# Patient Record
Sex: FEMALE | Race: OTHER | HISPANIC OR LATINO | URBAN - METROPOLITAN AREA
[De-identification: names, ages, dates, MRNs, and addresses within clinical notes are randomized per-mention and may not be internally consistent; named-entity substitution may affect disease eponyms.]

---

## 2017-12-15 ENCOUNTER — OUTPATIENT (OUTPATIENT)
Dept: OUTPATIENT SERVICES | Facility: HOSPITAL | Age: 62
LOS: 1 days | Discharge: HOME | End: 2017-12-15

## 2017-12-15 DIAGNOSIS — R19.03 RIGHT LOWER QUADRANT ABDOMINAL SWELLING, MASS AND LUMP: ICD-10-CM

## 2017-12-29 ENCOUNTER — OUTPATIENT (OUTPATIENT)
Dept: OUTPATIENT SERVICES | Facility: HOSPITAL | Age: 62
LOS: 1 days | Discharge: HOME | End: 2017-12-29

## 2017-12-29 DIAGNOSIS — R19.03 RIGHT LOWER QUADRANT ABDOMINAL SWELLING, MASS AND LUMP: ICD-10-CM

## 2018-01-19 ENCOUNTER — OUTPATIENT (OUTPATIENT)
Dept: OUTPATIENT SERVICES | Facility: HOSPITAL | Age: 63
LOS: 1 days | Discharge: HOME | End: 2018-01-19

## 2018-01-19 DIAGNOSIS — Z12.31 ENCOUNTER FOR SCREENING MAMMOGRAM FOR MALIGNANT NEOPLASM OF BREAST: ICD-10-CM

## 2019-03-11 PROBLEM — Z00.00 ENCOUNTER FOR PREVENTIVE HEALTH EXAMINATION: Status: ACTIVE | Noted: 2019-03-11

## 2019-03-13 ENCOUNTER — INPATIENT (INPATIENT)
Facility: HOSPITAL | Age: 64
LOS: 3 days | End: 2019-03-17
Attending: INTERNAL MEDICINE | Admitting: INTERNAL MEDICINE
Payer: MEDICAID

## 2019-03-13 VITALS
TEMPERATURE: 97 F | DIASTOLIC BLOOD PRESSURE: 48 MMHG | HEART RATE: 68 BPM | OXYGEN SATURATION: 100 % | RESPIRATION RATE: 18 BRPM | SYSTOLIC BLOOD PRESSURE: 83 MMHG

## 2019-03-13 DIAGNOSIS — Z98.891 HISTORY OF UTERINE SCAR FROM PREVIOUS SURGERY: Chronic | ICD-10-CM

## 2019-03-13 LAB
ALBUMIN SERPL ELPH-MCNC: 2.7 G/DL — LOW (ref 3.5–5.2)
ALBUMIN SERPL ELPH-MCNC: 3.4 G/DL — LOW (ref 3.5–5.2)
ALP SERPL-CCNC: 627 U/L — HIGH (ref 30–115)
ALP SERPL-CCNC: 846 U/L — HIGH (ref 30–115)
ALT FLD-CCNC: 114 U/L — HIGH (ref 0–41)
ALT FLD-CCNC: 149 U/L — HIGH (ref 0–41)
ANION GAP SERPL CALC-SCNC: 27 MMOL/L — HIGH (ref 7–14)
ANISOCYTOSIS BLD QL: SLIGHT — SIGNIFICANT CHANGE UP
APPEARANCE UR: ABNORMAL
APTT BLD: 43.3 SEC — HIGH (ref 27–39.2)
AST SERPL-CCNC: 637 U/L — HIGH (ref 0–41)
AST SERPL-CCNC: 809 U/L — HIGH (ref 0–41)
BACTERIA # UR AUTO: ABNORMAL /HPF
BASE EXCESS BLDV CALC-SCNC: -13.8 MMOL/L — LOW (ref -2–2)
BASE EXCESS BLDV CALC-SCNC: -9.4 MMOL/L — LOW (ref -2–2)
BASOPHILS # BLD AUTO: 0 K/UL — SIGNIFICANT CHANGE UP (ref 0–0.2)
BASOPHILS # BLD AUTO: 0.01 K/UL — SIGNIFICANT CHANGE UP (ref 0–0.2)
BASOPHILS NFR BLD AUTO: 0 % — SIGNIFICANT CHANGE UP (ref 0–1)
BASOPHILS NFR BLD AUTO: 0.3 % — SIGNIFICANT CHANGE UP (ref 0–1)
BILIRUB DIRECT SERPL-MCNC: 1.9 MG/DL — HIGH (ref 0–0.2)
BILIRUB INDIRECT FLD-MCNC: 0.1 MG/DL — LOW (ref 0.2–1.2)
BILIRUB SERPL-MCNC: 2 MG/DL — HIGH (ref 0.2–1.2)
BILIRUB SERPL-MCNC: 2.4 MG/DL — HIGH (ref 0.2–1.2)
BILIRUB UR-MCNC: NEGATIVE — SIGNIFICANT CHANGE UP
BUN SERPL-MCNC: 82 MG/DL — CRITICAL HIGH (ref 10–20)
CA-I SERPL-SCNC: 1.06 MMOL/L — LOW (ref 1.12–1.3)
CA-I SERPL-SCNC: 1.09 MMOL/L — LOW (ref 1.12–1.3)
CALCIUM SERPL-MCNC: 9.3 MG/DL — SIGNIFICANT CHANGE UP (ref 8.5–10.1)
CHLORIDE SERPL-SCNC: 98 MMOL/L — SIGNIFICANT CHANGE UP (ref 98–110)
CK SERPL-CCNC: 53 U/L — SIGNIFICANT CHANGE UP (ref 0–225)
CO2 SERPL-SCNC: 13 MMOL/L — LOW (ref 17–32)
COLOR SPEC: YELLOW — SIGNIFICANT CHANGE UP
CREAT SERPL-MCNC: 3.1 MG/DL — HIGH (ref 0.7–1.5)
D DIMER BLD IA.RAPID-MCNC: 688 NG/ML DDU — HIGH (ref 0–230)
DIFF PNL FLD: ABNORMAL
ELLIPTOCYTES BLD QL SMEAR: SLIGHT — SIGNIFICANT CHANGE UP
EOSINOPHIL # BLD AUTO: 0 K/UL — SIGNIFICANT CHANGE UP (ref 0–0.7)
EOSINOPHIL # BLD AUTO: 0 K/UL — SIGNIFICANT CHANGE UP (ref 0–0.7)
EOSINOPHIL NFR BLD AUTO: 0 % — SIGNIFICANT CHANGE UP (ref 0–8)
EOSINOPHIL NFR BLD AUTO: 0 % — SIGNIFICANT CHANGE UP (ref 0–8)
EPI CELLS # UR: ABNORMAL /HPF
FIBRINOGEN PPP-MCNC: 127 MG/DL — LOW (ref 204.4–570.6)
FLU A RESULT: NEGATIVE — SIGNIFICANT CHANGE UP
FLU A RESULT: NEGATIVE — SIGNIFICANT CHANGE UP
FLUAV AG NPH QL: NEGATIVE — SIGNIFICANT CHANGE UP
FLUBV AG NPH QL: NEGATIVE — SIGNIFICANT CHANGE UP
GAS PNL BLDV: 135 MMOL/L — LOW (ref 136–145)
GAS PNL BLDV: 141 MMOL/L — SIGNIFICANT CHANGE UP (ref 136–145)
GAS PNL BLDV: SIGNIFICANT CHANGE UP
GAS PNL BLDV: SIGNIFICANT CHANGE UP
GLUCOSE SERPL-MCNC: 35 MG/DL — CRITICAL LOW (ref 70–99)
GLUCOSE UR QL: 100 MG/DL
HCO3 BLDV-SCNC: 14 MMOL/L — LOW (ref 22–29)
HCO3 BLDV-SCNC: 17 MMOL/L — LOW (ref 22–29)
HCT VFR BLD CALC: 20.3 % — LOW (ref 37–47)
HCT VFR BLD CALC: 28.2 % — LOW (ref 37–47)
HCT VFR BLDA CALC: 20.4 % — LOW (ref 34–44)
HCT VFR BLDA CALC: 29.8 % — LOW (ref 34–44)
HGB BLD CALC-MCNC: 6.6 G/DL — LOW (ref 14–18)
HGB BLD CALC-MCNC: 9.7 G/DL — LOW (ref 14–18)
HGB BLD-MCNC: 7 G/DL — CRITICAL LOW (ref 12–16)
HGB BLD-MCNC: 9 G/DL — LOW (ref 12–16)
IMM GRANULOCYTES NFR BLD AUTO: 9.9 % — HIGH (ref 0.1–0.3)
INR BLD: 1.31 RATIO — HIGH (ref 0.65–1.3)
KETONES UR-MCNC: NEGATIVE — SIGNIFICANT CHANGE UP
LACTATE BLDV-MCNC: 9.4 MMOL/L — HIGH (ref 0.5–1.6)
LACTATE BLDV-MCNC: 9.5 MMOL/L — HIGH (ref 0.5–1.6)
LACTATE SERPL-SCNC: 11.7 MMOL/L — CRITICAL HIGH (ref 0.5–2.2)
LEUKOCYTE ESTERASE UR-ACNC: NEGATIVE — SIGNIFICANT CHANGE UP
LIDOCAIN IGE QN: 37 U/L — SIGNIFICANT CHANGE UP (ref 7–60)
LYMPHOCYTES # BLD AUTO: 0.46 K/UL — LOW (ref 1.2–3.4)
LYMPHOCYTES # BLD AUTO: 0.56 K/UL — LOW (ref 1.2–3.4)
LYMPHOCYTES # BLD AUTO: 12 % — LOW (ref 20.5–51.1)
LYMPHOCYTES # BLD AUTO: 14.7 % — LOW (ref 20.5–51.1)
MAGNESIUM SERPL-MCNC: 2.6 MG/DL — HIGH (ref 1.8–2.4)
MANUAL SMEAR VERIFICATION: SIGNIFICANT CHANGE UP
MCHC RBC-ENTMCNC: 27.7 PG — SIGNIFICANT CHANGE UP (ref 27–31)
MCHC RBC-ENTMCNC: 28.2 PG — SIGNIFICANT CHANGE UP (ref 27–31)
MCHC RBC-ENTMCNC: 31.9 G/DL — LOW (ref 32–37)
MCHC RBC-ENTMCNC: 34.5 G/DL — SIGNIFICANT CHANGE UP (ref 32–37)
MCV RBC AUTO: 81.9 FL — SIGNIFICANT CHANGE UP (ref 81–99)
MCV RBC AUTO: 86.8 FL — SIGNIFICANT CHANGE UP (ref 81–99)
METAMYELOCYTES # FLD: 1 % — HIGH (ref 0–0)
MONOCYTES # BLD AUTO: 0.65 K/UL — HIGH (ref 0.1–0.6)
MONOCYTES # BLD AUTO: 0.73 K/UL — HIGH (ref 0.1–0.6)
MONOCYTES NFR BLD AUTO: 17 % — HIGH (ref 1.7–9.3)
MONOCYTES NFR BLD AUTO: 19 % — HIGH (ref 1.7–9.3)
MYELOCYTES NFR BLD: 3 % — HIGH (ref 0–0)
NEUTROPHILS # BLD AUTO: 2.22 K/UL — SIGNIFICANT CHANGE UP (ref 1.4–6.5)
NEUTROPHILS # BLD AUTO: 2.23 K/UL — SIGNIFICANT CHANGE UP (ref 1.4–6.5)
NEUTROPHILS NFR BLD AUTO: 57 % — SIGNIFICANT CHANGE UP (ref 42.2–75.2)
NEUTROPHILS NFR BLD AUTO: 58.1 % — SIGNIFICANT CHANGE UP (ref 42.2–75.2)
NEUTS BAND # BLD: 1 % — SIGNIFICANT CHANGE UP (ref 0–6)
NITRITE UR-MCNC: NEGATIVE — SIGNIFICANT CHANGE UP
NRBC # BLD: 0 /100 WBCS — SIGNIFICANT CHANGE UP (ref 0–0)
NRBC # BLD: 0 /100 — SIGNIFICANT CHANGE UP (ref 0–0)
NRBC # BLD: SIGNIFICANT CHANGE UP /100 WBCS (ref 0–0)
PCO2 BLDV: 41 MMHG — SIGNIFICANT CHANGE UP (ref 41–51)
PCO2 BLDV: 43 MMHG — SIGNIFICANT CHANGE UP (ref 41–51)
PH BLDV: 7.14 — LOW (ref 7.26–7.43)
PH BLDV: 7.24 — LOW (ref 7.26–7.43)
PH UR: 6 — SIGNIFICANT CHANGE UP (ref 5–8)
PLAT MORPH BLD: SIGNIFICANT CHANGE UP
PLATELET # BLD AUTO: 20 K/UL — LOW (ref 130–400)
PLATELET # BLD AUTO: 30 K/UL — LOW (ref 130–400)
PO2 BLDV: 22 MMHG — SIGNIFICANT CHANGE UP (ref 20–40)
PO2 BLDV: 23 MMHG — SIGNIFICANT CHANGE UP (ref 20–40)
POTASSIUM BLDV-SCNC: 4.8 MMOL/L — SIGNIFICANT CHANGE UP (ref 3.3–5.6)
POTASSIUM BLDV-SCNC: 5.7 MMOL/L — HIGH (ref 3.3–5.6)
POTASSIUM SERPL-MCNC: 6 MMOL/L — CRITICAL HIGH (ref 3.5–5)
POTASSIUM SERPL-SCNC: 6 MMOL/L — CRITICAL HIGH (ref 3.5–5)
PROMYELOCYTES # FLD: 0 % — SIGNIFICANT CHANGE UP (ref 0–0)
PROT SERPL-MCNC: 3.9 G/DL — LOW (ref 6–8)
PROT SERPL-MCNC: 5.4 G/DL — LOW (ref 6–8)
PROT UR-MCNC: 30 MG/DL
PROTHROM AB SERPL-ACNC: 15 SEC — HIGH (ref 9.95–12.87)
RBC # BLD: 2.48 M/UL — LOW (ref 4.2–5.4)
RBC # BLD: 3.25 M/UL — LOW (ref 4.2–5.4)
RBC # FLD: 17.8 % — HIGH (ref 11.5–14.5)
RBC # FLD: 18.2 % — HIGH (ref 11.5–14.5)
RBC BLD AUTO: NORMAL — SIGNIFICANT CHANGE UP
RBC CASTS # UR COMP ASSIST: ABNORMAL /HPF
RSV RESULT: NEGATIVE — SIGNIFICANT CHANGE UP
RSV RNA RESP QL NAA+PROBE: NEGATIVE — SIGNIFICANT CHANGE UP
SAO2 % BLDV: 24 % — SIGNIFICANT CHANGE UP
SAO2 % BLDV: 30 % — SIGNIFICANT CHANGE UP
SODIUM SERPL-SCNC: 138 MMOL/L — SIGNIFICANT CHANGE UP (ref 135–146)
SP GR SPEC: 1.01 — SIGNIFICANT CHANGE UP (ref 1.01–1.03)
TROPONIN T SERPL-MCNC: <0.01 NG/ML — SIGNIFICANT CHANGE UP
UROBILINOGEN FLD QL: 1 MG/DL (ref 0.2–0.2)
VARIANT LYMPHS # BLD: 7 % — HIGH (ref 0–5)
WBC # BLD: 3.82 K/UL — LOW (ref 4.8–10.8)
WBC # BLD: 3.84 K/UL — LOW (ref 4.8–10.8)
WBC # FLD AUTO: 3.82 K/UL — LOW (ref 4.8–10.8)
WBC # FLD AUTO: 3.84 K/UL — LOW (ref 4.8–10.8)

## 2019-03-13 RX ORDER — VANCOMYCIN HCL 1 G
750 VIAL (EA) INTRAVENOUS ONCE
Qty: 0 | Refills: 0 | Status: COMPLETED | OUTPATIENT
Start: 2019-03-13 | End: 2019-03-13

## 2019-03-13 RX ORDER — RAMIPRIL 5 MG
1 CAPSULE ORAL
Qty: 0 | Refills: 0 | COMMUNITY

## 2019-03-13 RX ORDER — SODIUM CHLORIDE 9 MG/ML
1214 INJECTION, SOLUTION INTRAVENOUS ONCE
Qty: 0 | Refills: 0 | Status: COMPLETED | OUTPATIENT
Start: 2019-03-13 | End: 2019-03-13

## 2019-03-13 RX ORDER — VANCOMYCIN HCL 1 G
750 VIAL (EA) INTRAVENOUS EVERY 12 HOURS
Qty: 0 | Refills: 0 | Status: DISCONTINUED | OUTPATIENT
Start: 2019-03-13 | End: 2019-03-14

## 2019-03-13 RX ORDER — NOREPINEPHRINE BITARTRATE/D5W 8 MG/250ML
0.05 PLASTIC BAG, INJECTION (ML) INTRAVENOUS
Qty: 8 | Refills: 0 | Status: DISCONTINUED | OUTPATIENT
Start: 2019-03-13 | End: 2019-03-15

## 2019-03-13 RX ORDER — CALCIUM GLUCONATE 100 MG/ML
2 VIAL (ML) INTRAVENOUS ONCE
Qty: 0 | Refills: 0 | Status: COMPLETED | OUTPATIENT
Start: 2019-03-13 | End: 2019-03-13

## 2019-03-13 RX ORDER — VASOPRESSIN 20 [USP'U]/ML
0.04 INJECTION INTRAVENOUS
Qty: 100 | Refills: 0 | Status: DISCONTINUED | OUTPATIENT
Start: 2019-03-13 | End: 2019-03-14

## 2019-03-13 RX ORDER — SODIUM CHLORIDE 9 MG/ML
1000 INJECTION, SOLUTION INTRAVENOUS ONCE
Qty: 0 | Refills: 0 | Status: COMPLETED | OUTPATIENT
Start: 2019-03-13 | End: 2019-03-13

## 2019-03-13 RX ORDER — ACETAMINOPHEN 500 MG
650 TABLET ORAL ONCE
Qty: 0 | Refills: 0 | Status: COMPLETED | OUTPATIENT
Start: 2019-03-13 | End: 2019-03-13

## 2019-03-13 RX ORDER — SODIUM CHLORIDE 9 MG/ML
1000 INJECTION, SOLUTION INTRAVENOUS
Qty: 0 | Refills: 0 | Status: DISCONTINUED | OUTPATIENT
Start: 2019-03-13 | End: 2019-03-14

## 2019-03-13 RX ORDER — SODIUM CHLORIDE 9 MG/ML
1000 INJECTION, SOLUTION INTRAVENOUS
Qty: 0 | Refills: 0 | Status: DISCONTINUED | OUTPATIENT
Start: 2019-03-13 | End: 2019-03-13

## 2019-03-13 RX ORDER — RAMIPRIL 5 MG
0 CAPSULE ORAL
Qty: 0 | Refills: 0 | COMMUNITY

## 2019-03-13 RX ORDER — DEXTROSE 50 % IN WATER 50 %
50 SYRINGE (ML) INTRAVENOUS ONCE
Qty: 0 | Refills: 0 | Status: COMPLETED | OUTPATIENT
Start: 2019-03-13 | End: 2019-03-13

## 2019-03-13 RX ORDER — FENTANYL CITRATE 50 UG/ML
25 INJECTION INTRAVENOUS ONCE
Qty: 0 | Refills: 0 | Status: DISCONTINUED | OUTPATIENT
Start: 2019-03-13 | End: 2019-03-13

## 2019-03-13 RX ORDER — ASPIRIN/CALCIUM CARB/MAGNESIUM 324 MG
1 TABLET ORAL
Qty: 0 | Refills: 0 | COMMUNITY

## 2019-03-13 RX ORDER — PIPERACILLIN AND TAZOBACTAM 4; .5 G/20ML; G/20ML
3.38 INJECTION, POWDER, LYOPHILIZED, FOR SOLUTION INTRAVENOUS EVERY 12 HOURS
Qty: 0 | Refills: 0 | Status: DISCONTINUED | OUTPATIENT
Start: 2019-03-13 | End: 2019-03-14

## 2019-03-13 RX ORDER — PIPERACILLIN AND TAZOBACTAM 4; .5 G/20ML; G/20ML
3.38 INJECTION, POWDER, LYOPHILIZED, FOR SOLUTION INTRAVENOUS ONCE
Qty: 0 | Refills: 0 | Status: COMPLETED | OUTPATIENT
Start: 2019-03-13 | End: 2019-03-13

## 2019-03-13 RX ORDER — FUROSEMIDE 40 MG
1 TABLET ORAL
Qty: 0 | Refills: 0 | COMMUNITY

## 2019-03-13 RX ORDER — SODIUM BICARBONATE 1 MEQ/ML
50 SYRINGE (ML) INTRAVENOUS ONCE
Qty: 0 | Refills: 0 | Status: COMPLETED | OUTPATIENT
Start: 2019-03-13 | End: 2019-03-13

## 2019-03-13 RX ORDER — SODIUM CHLORIDE 9 MG/ML
1000 INJECTION, SOLUTION INTRAVENOUS ONCE
Qty: 0 | Refills: 0 | Status: DISCONTINUED | OUTPATIENT
Start: 2019-03-13 | End: 2019-03-13

## 2019-03-13 RX ORDER — HYDROCORTISONE 20 MG
100 TABLET ORAL ONCE
Qty: 0 | Refills: 0 | Status: COMPLETED | OUTPATIENT
Start: 2019-03-13 | End: 2019-03-13

## 2019-03-13 RX ORDER — ALBUTEROL 90 UG/1
2.5 AEROSOL, METERED ORAL ONCE
Qty: 0 | Refills: 0 | Status: COMPLETED | OUTPATIENT
Start: 2019-03-13 | End: 2019-03-13

## 2019-03-13 RX ADMIN — SODIUM CHLORIDE 2000 MILLILITER(S): 9 INJECTION, SOLUTION INTRAVENOUS at 20:00

## 2019-03-13 RX ADMIN — Medication 200 GRAM(S): at 17:00

## 2019-03-13 RX ADMIN — SODIUM CHLORIDE 100 MILLILITER(S): 9 INJECTION, SOLUTION INTRAVENOUS at 17:57

## 2019-03-13 RX ADMIN — SODIUM CHLORIDE 1214 MILLILITER(S): 9 INJECTION, SOLUTION INTRAVENOUS at 15:45

## 2019-03-13 RX ADMIN — Medication 75 MILLIGRAM(S): at 18:50

## 2019-03-13 RX ADMIN — Medication 250 MILLIGRAM(S): at 22:00

## 2019-03-13 RX ADMIN — Medication 50 MILLILITER(S): at 15:55

## 2019-03-13 RX ADMIN — FENTANYL CITRATE 25 MICROGRAM(S): 50 INJECTION INTRAVENOUS at 19:05

## 2019-03-13 RX ADMIN — Medication 50 MILLIEQUIVALENT(S): at 17:33

## 2019-03-13 RX ADMIN — PIPERACILLIN AND TAZOBACTAM 200 GRAM(S): 4; .5 INJECTION, POWDER, LYOPHILIZED, FOR SOLUTION INTRAVENOUS at 21:00

## 2019-03-13 RX ADMIN — VASOPRESSIN 2.4 UNIT(S)/MIN: 20 INJECTION INTRAVENOUS at 21:01

## 2019-03-13 RX ADMIN — SODIUM CHLORIDE 1000 MILLILITER(S): 9 INJECTION, SOLUTION INTRAVENOUS at 16:08

## 2019-03-13 RX ADMIN — SODIUM CHLORIDE 100 MILLILITER(S): 9 INJECTION, SOLUTION INTRAVENOUS at 20:46

## 2019-03-13 RX ADMIN — Medication 3.8 MICROGRAM(S)/KG/MIN: at 20:01

## 2019-03-13 RX ADMIN — SODIUM CHLORIDE 50 MILLILITER(S): 9 INJECTION, SOLUTION INTRAVENOUS at 16:35

## 2019-03-13 RX ADMIN — ALBUTEROL 2.5 MILLIGRAM(S): 90 AEROSOL, METERED ORAL at 17:52

## 2019-03-13 RX ADMIN — Medication 100 MILLIGRAM(S): at 18:34

## 2019-03-13 RX ADMIN — Medication 650 MILLIGRAM(S): at 15:55

## 2019-03-13 NOTE — ED PROVIDER NOTE - SECONDARY DIAGNOSIS.
ADONIS (acute kidney injury) Uremia Hyperkalemia Lymphoma Hypoglycemia Anemia Lactic acidosis Severe protein-calorie malnutrition Severe dehydration Cholecystitis

## 2019-03-13 NOTE — ED PROVIDER NOTE - PROGRESS NOTE DETAILS
I reassessed the patient, reviewed vital signs. heart RRR, 2+ radial pulse, skin warm and dry. I reassessed the patient, reviewed vital signs. heart RRR, 2+ radial pulse, skin warm and dry. Character of sepsis of low suspicion for acute bacterial cause, higher suspicion for viral URI/flu, as such not given abx initially. Signed off care to Dr. MADELINE Weber who will f/u nephro and ICU consults that have been placed, non-con CT's, and reassess pt. Patient accepted to MICU by Dr. Adams. Bedside ultrasound significant for likely cholecystitis, STAT surgery consult called and will come evaluate patient. Spoke w/ nephrology who will also come evaluate patient. Patient signed out to MICU resident who will follow up recommendations from consultants and results of CT chest/abdomen. spoke with Dr. Gonzalez (nephrologist) aware of pt and consult. agrees with plan for IV Abx and ICU admission. recommends D5 with 3 amps of bicarb. discussed plan with pts daughter. aware of pts diagnosis. agrees with plan

## 2019-03-13 NOTE — ED PROVIDER NOTE - CARE PLAN
Principal Discharge DX:	Severe sepsis  Secondary Diagnosis:	ADONIS (acute kidney injury)  Secondary Diagnosis:	Uremia  Secondary Diagnosis:	Hypoglycemia  Secondary Diagnosis:	Severe dehydration  Secondary Diagnosis:	Anemia  Secondary Diagnosis:	Lactic acidosis

## 2019-03-13 NOTE — H&P ADULT - HISTORY OF PRESENT ILLNESS
64 y/o F with PMHx lymphoma (diagnosed 1 week ago via neck mass, not on chemo), HTN, "heart failure" that resulted in fluid overload, presenting for nausea and vomiting. After finding out about her diagnosis, she has not been eating a drinking and eating for the past 1 wk, but has been taking lasix 40mg daily. She woke up this morning feeling, nauseous, weak, and this then resulted in the pt going to . There she was hypotensive and sent to the ER. She was hypotensive, febrile in the ED. She received 5L bolus, was started on levo and vasopressin, a R IJ central line and A line was placed bc her bp on the cuff was labile. She had CT abd and RUQ sono which showed concern for cholecystitis. Surgery evaluated pt and was not impressed with clinical picture. GI evaluated pt at bedside and stated she is low risk for chloledochelithiasis and recommended percutaneous lisbeth if the labwork worsens. Pt has no hx of gallbladder dx,  no abd sx besides .

## 2019-03-13 NOTE — ED ADULT NURSE NOTE - PMH
Hypertension, unspecified type    Lymphoma, unspecified body region, unspecified lymphoma type    Weakness

## 2019-03-13 NOTE — H&P ADULT - NSHPPHYSICALEXAM_GEN_ALL_CORE
General: cachectic ill appearing woman, Czech speaking  Cardiac: RRR, S1S2  Lungs: CTAB  Abd: no specific area of pain in the abd, the pain moves around, though she was Lenz pos with RUQ sono  LE: no swelling  Neuro: AAOx3, nonfocal

## 2019-03-13 NOTE — ED PROCEDURE NOTE - CPROC ED INFUS LINE DETAIL1
The catheter was placed using sterile technique./All lumen(s) aspirated and flushed without difficulty./The guidewire was recovered./The location was identified, and the area was draped and prepped./Ultrasound guidance was used during placement.

## 2019-03-13 NOTE — CONSULT NOTE ADULT - SUBJECTIVE AND OBJECTIVE BOX
General Surgery Consultation Note    64 y/o F with PMHx lymphoma (diagnosed 1 week ago via neck mass, not on chemo), HTN, "heart failure" that resulted in fluid overload, presenting for nausea and vomiting. After finding out about her diagnosis, she has not been eating a drinking and eating for the past 1 wk, but has been taking lasix 40mg daily. She woke up this morning feeling, nauseous, weak, and this then resulted in the pt going to . There she was hypotensive and sent to the ER. She was hypotensive, febrile in the ED. She received 5L bolus, was started on levo and vasopressin, a R IJ central line and A line was placed bc her bp on the cuff was labile. She had CT abd and RUQ sono which showed concern for cholecystitis. Surgery evaluated pt and was not impressed with clinical picture. GI evaluated pt at bedside and stated she is low risk for chloledochelithiasis and recommended percutaneous lisbeth if the labwork worsens. Pt has no hx of gallbladder dx,  no abd sx besides . (13 Mar 2019 20:45)    PAST MEDICAL & SURGICAL HISTORY:  Hypertension, unspecified type  Lymphoma, unspecified body region, unspecified lymphoma type  Weakness  S/P     Home Meds: Home Medications:  aspirin 81 mg oral tablet, chewable: 1 tab(s) orally once a day (13 Mar 2019 21:04)  furosemide 40 mg oral tablet: 1 tab(s) orally once a day (13 Mar 2019 21:04)  ramipril 10 mg oral capsule: 1 cap(s) orally once a day (13 Mar 2019 21:04)    Allergies:   No Known Allergies    Intolerances  None reported    Soc:   Advanced Directives: Presumed Full Code     ROS:    REVIEW OF SYSTEMS    [x] A ten-point review of systems was otherwise negative except as noted.  [ ] Due to altered mental status/intubation, subjective information were not able to be obtained from the patient. History was obtained, to the extent possible, from review of the chart and collateral sources of information.      CURRENT MEDICATIONS:   --------------------------------------------------------------------------------------  Cardiovascular Medications  norepinephrine Infusion 0.05 MICROgram(s)/kG/Min IV Continuous <Continuous>    Gastrointestinal Medications  dextrose 5% 1000 milliLiter(s) IV Continuous <Continuous>    Antimicrobial/Immunologic Medications  piperacillin/tazobactam IVPB. 3.375 Gram(s) IV Intermittent once  piperacillin/tazobactam IVPB. 3.375 Gram(s) IV Intermittent every 12 hours  vancomycin  IVPB 750 milliGRAM(s) IV Intermittent once  vancomycin  IVPB 750 milliGRAM(s) IV Intermittent every 12 hours    Endocrine/Metabolic Medications  vasopressin Infusion 0.04 Unit(s)/Min IV Continuous <Continuous>    --------------------------------------------------------------------------------------  VITAL SIGNS, INS/OUTS (last 24 hours):  --------------------------------------------------------------------------------------  ICU Vital Signs Last 24 Hrs  T(C): 37.7 (13 Mar 2019 16:20), Max: 38.2 (13 Mar 2019 15:19)  T(F): 99.8 (13 Mar 2019 16:20), Max: 100.8 (13 Mar 2019 15:19)  HR: 69 (13 Mar 2019 16:20) (68 - 69)  BP: 102/49 (13 Mar 2019 16:20) (83/48 - 102/49)  RR: 17 (13 Mar 2019 16:20) (17 - 18)  SpO2: 100% (13 Mar 2019 16:20) (100% - 100%)    PHYSICAL EXAM    General: NAD, cachectic ill appearing  HEENT: NCAT  Cardiac: RRR S1, S2  Respiratory: CTAB, normal respiratory effort  Abdomen: Soft, non-distended, non-tender  Neuro: Grossly intact  Vascular: Pulses 2+ throughout, extremities well perfused  Skin: Warm/dry, normal color, no jaundice    LABS  --------------------------------------------------------------------------------------  Labs:  CAPILLARY BLOOD GLUCOSE      POCT Blood Glucose.: 209 mg/dL (13 Mar 2019 17:21)  POCT Blood Glucose.: 27 mg/dL (13 Mar 2019 16:25)                          9.0    3.84  )-----------( 30       ( 13 Mar 2019 15:05 )             28.2       Auto Neutrophil %: 57.0 % (19 @ 15:05)  Band Neutrophils %: 1.0 % (19 @ 15:05)        138  |  98  |  82<HH>  ----------------------------<  35<LL>  6.0<HH>   |  13<L>  |  3.1<H>      eGFR if Non African American: 15 mL/min/1.73M2 (19 @ 15:05)      LFTs:             x    | x    | x        ------------------[x       ( 13 Mar 2019 15:10 )  x    | x    | x           Lipase:37     Amylase:x         Blood Gas Venous - Lactate: 9.4 mmoL/L (19 @ 17:15)  Blood Gas Venous - Lactate: 9.5 mmoL/L (19 @ 16:40)      Coags:     15.00  ----< 1.31    ( 13 Mar 2019 15:05 )     43.3        CARDIAC MARKERS ( 13 Mar 2019 15:05 )  x     / <0.01 ng/mL / x     / x     / x              Urinalysis Basic - ( 13 Mar 2019 19:44 )    Color: Yellow / Appearance: Cloudy / S.015 / pH: x  Gluc: x / Ketone: Negative  / Bili: Negative / Urobili: 1.0 mg/dL   Blood: x / Protein: 30 mg/dL / Nitrite: Negative   Leuk Esterase: Negative / RBC: 3-5 /HPF / WBC x   Sq Epi: x / Non Sq Epi: Few /HPF / Bacteria: Few /HPF      IMAGING RESULTS  < from: US Abdomen Limited (19 @ 20:01) >  IMPRESSION:  Cholelithiasis with pericholecystic fluid and positive Lenz's sign. Findings compatible with acute cholecystitis. Upper abdominal ascites.    < from: CT Abdomen and Pelvis No Cont (19 @ 18:22) >  IMPRESSION:   1. Aneurysmal dilation of the abdominal aorta up to 4.3 cm with a focal area of crescentic wall density suggestive of an intramural hematoma.  2. Gallbladder wall edema and perihepatic fluid. Findings suggestive of underlying cholecystitis.  3. Extensive retroperitoneal and pelvic lymphadenopathy with associated splenomegaly. Findings compatible with underlying lymphoma.  4. Air and fluid collection in the anterior thigh measuring up to 5.2 cm.    < from: Xray Chest 1 View-PORTABLE IMMEDIATE (19 @ 16:23) >  Impression:    No radiographic evidence of acute cardiopulmonary disease.

## 2019-03-13 NOTE — ED PROVIDER NOTE - CLINICAL SUMMARY MEDICAL DECISION MAKING FREE TEXT BOX
63yoF with h/o HTN, recently diagnosed lymphoma, on ASA/ramipril/Lasix, presents with generalized weakness, significantly decreased PO, and rhinorrhea/cough x3 days. Temp to 100.0F last night per daughter, who translates, and pt states only that her entire body aches her, no one place more than another, and no other acute complaints. On exam, febrile, hypotensive, saturating well on RA, NAD, chronic ill/cachectic appearance, non toxic appearing, head NCAT, EOMI grossly, anicteric, MM dry, no JVD, RRR, nml S1/S2, no m/r/g, lungs CTAB, no w/r/r, abd soft, NT, ND, nml BS, no rebound or guarding, AAO, CN's 3-12 grossly intact, VANCE spontaneously, no leg cyanosis or edema, skin warm, dry, no rashes or hives. No meningeal signs, cellulitis, e/o PNA on exam. UA pending. Patient with ADONIS with uremia, hyperkalemia with mildly peaked T waves, hypoglycemia. Given abx for concern for sepsis at this time, D50/calcium gluc/albuterol for hyperK. Also given hydrocortisone for concern for adrenal insufficiency. Pt is now with 2L NS and 1L going in of D5, with SBP >100 currently, urine production on Urrutia insertion. Nephrology consult pending, unsuccessful at reaching them to this point, official consult placed. Patient accepted to ICU, they will also f/u the non-con CT's placed. 63yoF with h/o HTN, recently diagnosed lymphoma, on ASA/ramipril/Lasix, presents with generalized weakness, significantly decreased PO, and rhinorrhea/cough x3 days. Temp to 100.0F last night per daughter, who translates, and pt states only that her entire body aches her, no one place more than another, and no other acute complaints. On exam, febrile, hypotensive, saturating well on RA, NAD, chronic ill/cachectic appearance, non toxic appearing, head NCAT, EOMI grossly, anicteric, MM dry, no JVD, RRR, nml S1/S2, no m/r/g, lungs CTAB, no w/r/r, abd soft, NT, ND, nml BS, no rebound or guarding, AAO, CN's 3-12 grossly intact, VANCE spontaneously, no leg cyanosis or edema, skin warm, dry, no rashes or hives. No meningeal signs, cellulitis, e/o PNA on exam. UA pending. Patient with ADONIS with uremia, hyperkalemia with mildly peaked T waves, hypoglycemia. Given abx for concern for sepsis at this time, D50/calcium gluc/albuterol for hyperK. Also given hydrocortisone for concern for adrenal insufficiency. Pt is now with 2L NS and 1L going in of D5, with SBP >100 currently, urine production on Urrutia insertion. Furthermore, pt is non-toxic appearing and mentating well throughout. Nephrology consult pending, unsuccessful at reaching them to this point, official consult placed. Patient accepted to ICU, they will also f/u the non-con CT's placed. 63yoF with h/o HTN, recently diagnosed lymphoma, on ASA/ramipril/Lasix, presents with generalized weakness, significantly decreased PO, and rhinorrhea/cough x3 days. Temp to 100.0F last night per daughter, who translates, and pt states only that her entire body aches her, no one place more than another, and no other acute complaints. On exam, febrile, hypotensive, saturating well on RA, NAD, chronic ill/cachectic appearance, non toxic appearing, head NCAT, EOMI grossly, anicteric, MM dry, no JVD, RRR, nml S1/S2, no m/r/g, lungs CTAB, no w/r/r, abd soft, NT, ND, nml BS, no rebound or guarding, AAO, CN's 3-12 grossly intact, VANCE spontaneously, no leg cyanosis or edema, skin warm, dry, no rashes or hives. No meningeal signs, cellulitis, e/o PNA on exam. UA pending. Patient with ADONIS with uremia, hyperkalemia with mildly peaked T waves, hypoglycemia. Given abx for concern for sepsis at this time, D50/calcium gluc/albuterol for hyperK. Also given hydrocortisone for concern for adrenal insufficiency. Pt is now with 2L NS and 1L going in of D5, with SBP >100 currently, urine production on Urrutia insertion. Furthermore, pt is non-toxic appearing and mentating well throughout. Nephrology consult pending, unsuccessful at reaching them to this point, official consult placed. Patient accepted to ICU, they will also f/u the non-con CT's placed. Also of note, after admission, bedside U/S shows pericholecystic fluid and GB wall edema with gallstones, concerning for cholecystitis with cholelithiasis. Surgery consulted and will evaluate pt.

## 2019-03-13 NOTE — H&P ADULT - ASSESSMENT
1. Septic Shock 2/2 Cholecystitis in setting of Newly Diagnosed Lymphoma, not on chemo  Concern for Liver Failure Due to Transaminitis and Elevated LA not responsive to fluid resuscitation, will r/o DIC  any alternative source of infection aside from cholecystitis missed due to lack of contrast?  -start on zosyn and vanc, check for c diff due to recent hospitalization, pan cx  -c/w levo and vasopressin, goal sys bp 110-140  -RUQ, CT chest and abd appreciated  -GI rec: low chance of choledocholithiasis, if pt and labwork worsens then consider IR c/s for perc lisbeth  -Gen Sx rec: not impressed that this sepsis is from cholecystitis, will follow up pending repeat bloodwork to see if lefts are trending up or down  -plt are not clumped, no schistocytes  -npo for now in case of intervention    2. Lymphoma: will call [facility] in the am and obtain path report    3. Aneurysmal dilation of the abdominal aorta up to 4.3 cm with a focal area of crescentic wall density suggestive of an intramural hematoma  -case discussed with Vascular, nothing to do now, they will do need to do a formal cta once pt is no longer septic  -for now keep bp 110-140 systolic, NOT higher    4. ADONIS likely prerenal due to dec po intake and continued ace and lasix  -hold all diuretics, stop all antihypertensives  -in light of acidosis, will c/w D5+bicarb drip  -if pt becomes hyperkalemic again, will tx and consider dialysis     5. Normocytic Anemia, Thrombocytopenia  -will check fecal occult blood, no prior hgb for comparison  -transfuse if hgb<7  -plt are not clumped, no schistocytes  -will send iron studies  -could be 2/2 malignancy    6. DVT PPx: hold off due to thrombocytopenia 1. Septic Shock 2/2 Cholecystitis in setting of Newly Diagnosed Lymphoma, not on chemo  Concern for Liver Failure Due to Transaminitis and Elevated LA not responsive to fluid resuscitation, will r/o DIC  any alternative source of infection aside from cholecystitis missed due to lack of contrast?  -start on zosyn and vanc, check for c diff due to recent hospitalization, pan cx  -c/w levo and vasopressin, goal sys bp 110-140  -RUQ, CT chest and abd appreciated  -GI rec: low chance of choledocholithiasis, if pt and labwork worsens then consider IR c/s for perc lisbeth  -Gen Sx rec: not impressed that this sepsis is from cholecystitis, will follow up pending repeat bloodwork to see if lefts are trending up or down  -plt are not clumped, no schistocytes  -npo for now in case of intervention    2. Lymphoma: will call [facility] in the am and obtain path report    3. Aneurysmal dilation of the abdominal aorta up to 4.3 cm with a focal area of crescentic wall density suggestive of an intramural hematoma  -case discussed with Vascular, nothing to do now, they will do need to do a formal cta once pt is no longer septic  -for now keep bp 110-140 systolic, NOT higher    4. ADONIS likely prerenal due to dec po intake and continued ace and lasix  -hold all diuretics, stop all antihypertensives  -in light of acidosis, will c/w D5+bicarb drip  -if pt becomes hyperkalemic again, will tx and consider dialysis     5. Normocytic Anemia, Thrombocytopenia  -will check fecal occult blood, no prior hgb for comparison  -transfuse if hgb<7  -plt are not clumped, no schistocytes  -will send iron studies  -could be 2/2 malignancy    6. DVT PPx: hold off due to thrombocytopenia      Interval History: Bloodwork indicates pt is in DIC, high D dimer, low fibrinogen, elevated coags, thrombocytopenia. Likely 2/2 sepsis from cholecystitis and malignancy. Will recontact Sx and possibly IR for perc cholecystostomy. 1. Septic Shock 2/2 Cholecystitis in setting of Newly Diagnosed Lymphoma, not on chemo  Concern for Liver Failure Due to Transaminitis and Elevated LA not responsive to fluid resuscitation, will r/o DIC  any alternative source of infection aside from cholecystitis missed due to lack of contrast?  -start on zosyn and vanc, check for c diff due to recent hospitalization, pan cx  -c/w levo and vasopressin, goal sys bp 110-140  -RUQ, CT chest and abd appreciated  -GI rec: low chance of choledocholithiasis, if pt and labwork worsens then consider IR c/s for perc lisbeth  -Gen Sx rec: not impressed that this sepsis is from cholecystitis, will follow up pending repeat bloodwork to see if lefts are trending up or down  -plt are not clumped, no schistocytes  -npo for now in case of intervention    2. Lymphoma: will call [facility] in the am and obtain path report    3. Aneurysmal dilation of the abdominal aorta up to 4.3 cm with a focal area of crescentic wall density suggestive of an intramural hematoma  -case discussed with Vascular, nothing to do now, they will do need to do a formal cta once pt is no longer septic  -for now keep bp 110-140 systolic, NOT higher    4. ADONIS likely prerenal due to dec po intake and continued ace and lasix  -hold all diuretics, stop all antihypertensives  -in light of acidosis, will c/w D5+bicarb drip  -if pt becomes hyperkalemic again, will tx and consider dialysis     5. Normocytic Anemia, Thrombocytopenia  -will check fecal occult blood, no prior hgb for comparison  -transfuse if hgb<7  -plt are not clumped, no schistocytes  -will send iron studies  -could be 2/2 malignancy    6. DVT PPx: hold off due to thrombocytopenia      Interval History: Bloodwork indicates pt is in DIC, high D dimer, low fibrinogen, elevated coags, thrombocytopenia. Likely 2/2 sepsis from cholecystitis and malignancy. Will recontact Sx and possibly IR for perc cholecystostomy.  Case discussed with Dr Rock from Sx-pt is not a surgical candidate. 1. Septic Shock 2/2 Cholecystitis in setting of Newly Diagnosed Lymphoma, not on chemo  Concern for Liver Failure Due to Transaminitis and Elevated LA not responsive to fluid resuscitation, will r/o DIC  any alternative source of infection aside from cholecystitis missed due to lack of contrast?  -start on erin and vanc, check for c diff due to recent hospitalization, pan cx  -c/w levo and vasopressin, goal sys bp 110-140  -RUQ, CT chest and abd appreciated  -GI rec: low chance of choledocholithiasis, if pt and labwork worsens then consider IR c/s for perc lisbeth  -Gen Sx rec: not impressed that this sepsis is from cholecystitis, will follow up pending repeat bloodwork to see if lefts are trending up or down  -plt are not clumped, no schistocytes  -npo for now in case of intervention    2. Lymphoma: will call [facility] in the am and obtain path report    3. Aneurysmal dilation of the abdominal aorta up to 4.3 cm with a focal area of crescentic wall density suggestive of an intramural hematoma  -case discussed with Vascular, nothing to do now, they will do need to do a formal cta once pt is no longer septic  -for now keep bp 110-140 systolic, NOT higher    4. ADONIS likely prerenal due to dec po intake and continued ace and lasix  -hold all diuretics, stop all antihypertensives  -in light of acidosis, will c/w D5+bicarb drip  -if pt becomes hyperkalemic again, will tx and consider dialysis     5. Normocytic Anemia, Thrombocytopenia  -will check fecal occult blood, no prior hgb for comparison  -transfuse if hgb<7  -plt are not clumped, no schistocytes  -will send iron studies  -could be 2/2 malignancy    6. DVT PPx: hold off due to thrombocytopenia      Interval History: Bloodwork indicates pt is in DIC, high D dimer, low fibrinogen, elevated coags, thrombocytopenia. Likely 2/2 sepsis from cholecystitis and malignancy. Will recontact Sx and possibly IR for perc cholecystostomy.  Case discussed with Dr Rock from Sx-pt is not a surgical candidate.   Case discussed with Dr Landau from IR-they said to treat her DIC right now with plt, cryo and whatever else is needed to correct her coagulopathy bc she has such a high risk of bleeding on the table. Start transfusing around 2am for possible lap lisbeth in the am. IR will follow.

## 2019-03-13 NOTE — ED ADULT NURSE NOTE - NSIMPLEMENTINTERV_GEN_ALL_ED
Implemented All Fall with Harm Risk Interventions:  [place] to call system. Call bell, personal items and telephone within reach. Instruct patient to call for assistance. Room bathroom lighting operational. Non-slip footwear when patient is off stretcher. Physically safe environment: no spills, clutter or unnecessary equipment. Stretcher in lowest position, wheels locked, appropriate side rails in place. Provide visual cue, wrist band, yellow gown, etc. Monitor gait and stability. Monitor for mental status changes and reorient to person, place, and time. Review medications for side effects contributing to fall risk. Reinforce activity limits and safety measures with patient and family. Provide visual clues: red socks.

## 2019-03-13 NOTE — ED ADULT TRIAGE NOTE - CHIEF COMPLAINT QUOTE
pt sent in from [facility] for LOW b/p and vomiting and dizziness. diagnosed with lymphatic CA 1 week ago

## 2019-03-13 NOTE — CONSULT NOTE ADULT - ASSESSMENT
64 y/o female w/ pmhx of recently diagnosed lymphoma ( not on ttt), HTN,? CHF was sent in from an urgent care for further evaluation for hypotension. patient was recently diagnosed with lymphoma and was recently hospitalized in another facility with CHF exacerbation ( as per her daughter). Since Monday daughter noticed decrease PO intake then today she started to have non bloody vomitus.. Patient is a poor historian , I spoke with her using the  phone # [phone], she reported having diffuse body aches and diffuse abdominal discomfort.    - Septic Shock , unclear etiology could be due to cholecystitis  - Elevated LFTs ( mainly cholestatic pattern) could be due to cholecystitis vs infiltrative disease secondary to lymphoma  Plan:  ICU monitoring  IV broad spectrum Abx  monitor lFTs  surgery evaluation  may consider IR evaluation for percutaneous cholecystostomy  check CK total, indirect sameer  low probability for choledocholithiasis, given low sameer and no biliary dilation   will follow up

## 2019-03-13 NOTE — ED PROCEDURE NOTE - NS ED ATTENDING STATEMENT MOD
Attending Only
I have personally performed a face to face diagnostic evaluation on this patient. I have reviewed the ACP note and agree with the history, exam and plan of care, except as noted.

## 2019-03-13 NOTE — H&P ADULT - NSHPLABSRESULTS_GEN_ALL_CORE
CARDIAC MARKERS ( 13 Mar 2019 15:05 )  x     / <0.01 ng/mL / x     / x     / x                            9.0    3.84  )-----------( 30       ( 13 Mar 2019 15:05 )             28.2       138  |  98  |  82<HH>  ----------------------------<  35<LL>  6.0<HH>   |  13<L>  |  3.1<H>  Ca    9.3      13 Mar 2019 15:05  Mg     2.6       TPro  5.4<L>  /  Alb  3.4<L>  /  TBili  2.4<H>  /  DBili  x   /  AST  809<H>  /  ALT  149<H>  /  AlkPhos  846<H>      POCT Blood Glucose.: 209 mg/dL (13 Mar 2019 17:21)  POCT Blood Glucose.: 27 mg/dL (13 Mar 2019 16:25)    LIVER FUNCTIONS - ( 13 Mar 2019 15:05 )  Alb: 3.4 g/dL / Pro: 5.4 g/dL / ALK PHOS: 846 U/L / ALT: 149 U/L / AST: 809 U/L / GGT: x           PT/INR - ( 13 Mar 2019 15:05 )   PT: 15.00 sec;   INR: 1.31 ratio    PTT - ( 13 Mar 2019 15:05 )  PTT:43.3 sec    Urinalysis Basic - ( 13 Mar 2019 19:44 )  Color: Yellow / Appearance: Cloudy / S.015 / pH: x  Gluc: x / Ketone: Negative  / Bili: Negative / Urobili: 1.0 mg/dL   Blood: x / Protein: 30 mg/dL / Nitrite: Negative   Leuk Esterase: Negative / RBC: 3-5 /HPF / WBC x   Sq Epi: x / Non Sq Epi: Few /HPF / Bacteria: Few /HPF

## 2019-03-13 NOTE — ED PROVIDER NOTE - ATTENDING CONTRIBUTION TO CARE
63yoF with h/o HTN, recently diagnosed lymphoma, on ASA/ramipril/Lasix, presents with generalized weakness, significantly decreased PO, and rhinorrhea/cough x3 days. Temp to 100.0F last night per daughter, who translates, and pt states only that her entire body aches her, no one place more than another, and no other acute complaints. On exam, febrile, hypotensive, saturating well on RA, NAD, chronic ill/cachectic appearance, non toxic appearing, head NCAT, EOMI grossly, anicteric, MM dry, no JVD, RRR, nml S1/S2, no m/r/g, lungs CTAB, no w/r/r, abd soft, NT, ND, nml BS, no rebound or guarding, AAO, CN's 3-12 grossly intact, VANCE spontaneously, no leg cyanosis or edema, skin warm, dry, no rashes or hives. No meningeal signs, cellulitis, e/o PNA on exam,     Noted ADONIS with uremia, hyperkalemia 63yoF with h/o HTN, recently diagnosed lymphoma, on ASA/ramipril/Lasix, presents with generalized weakness, significantly decreased PO, and rhinorrhea/cough x3 days. Temp to 100.0F last night per daughter, who translates, and pt states only that her entire body aches her, no one place more than another, and no other acute complaints. On exam, febrile, hypotensive, saturating well on RA, NAD, chronic ill/cachectic appearance, non toxic appearing, head NCAT, EOMI grossly, anicteric, MM dry, no JVD, RRR, nml S1/S2, no m/r/g, lungs CTAB, no w/r/r, abd soft, NT, ND, nml BS, no rebound or guarding, AAO, CN's 3-12 grossly intact, VANCE spontaneously, no leg cyanosis or edema, skin warm, dry, no rashes or hives. No meningeal signs, cellulitis, e/o PNA on exam. UA pending. Patient with ADONIS with uremia, hyperkalemia with mildly peaked T waves, hypoglycemia. Given abx for concern for sepsis at this time, D50/calcium gluc/albuterol for hyperK. Pt is now with 2L NS and 1L going in of D5, with SBP >100 currently, urine production on Urrutia insertion. Signed off care to Dr. MADELINE Weber who will f/u nephro and ICU consults that have been placed, non-con CT's, and reassess pt. 63yoF with h/o HTN, recently diagnosed lymphoma, on ASA/ramipril/Lasix, presents with generalized weakness, significantly decreased PO, and rhinorrhea/cough x3 days. Temp to 100.0F last night per daughter, who translates, and pt states only that her entire body aches her, no one place more than another, and no other acute complaints. On exam, febrile, hypotensive, saturating well on RA, NAD, chronic ill/cachectic appearance, non toxic appearing, head NCAT, EOMI grossly, anicteric, MM dry, no JVD, RRR, nml S1/S2, no m/r/g, lungs CTAB, no w/r/r, abd soft, NT, ND, nml BS, no rebound or guarding, AAO, CN's 3-12 grossly intact, VANCE spontaneously, no leg cyanosis or edema, skin warm, dry, no rashes or hives. No meningeal signs, cellulitis, e/o PNA on exam. UA pending. Patient with ADONIS with uremia, hyperkalemia with mildly peaked T waves, hypoglycemia. Given abx for concern for sepsis at this time, D50/calcium gluc/albuterol for hyperK. Also given hydrocortisone for concern for adrenal insufficiency. Pt is now with 2L NS and 1L going in of D5, with SBP >100 currently, urine production on Urrutia insertion. Nephrology consult pending, unsuccessful at reaching them to this point, official consult placed. Patient accepted to ICU, they will also f/u the non-con CT's placed. 63yoF with h/o HTN, recently diagnosed lymphoma, on ASA/ramipril/Lasix, presents with generalized weakness, significantly decreased PO, and rhinorrhea/cough x3 days. Temp to 100.0F last night per daughter, who translates, and pt states only that her entire body aches her, no one place more than another, and no other acute complaints. On exam, febrile, hypotensive, saturating well on RA, NAD, chronic ill/cachectic appearance, non toxic appearing, head NCAT, EOMI grossly, anicteric, MM dry, no JVD, RRR, nml S1/S2, no m/r/g, lungs CTAB, no w/r/r, abd soft, NT, ND, nml BS, no rebound or guarding, AAO, CN's 3-12 grossly intact, VANCE spontaneously, no leg cyanosis or edema, skin warm, dry, no rashes or hives. No meningeal signs, cellulitis, e/o PNA on exam. UA pending. Patient with ADONIS with uremia, hyperkalemia with mildly peaked T waves, hypoglycemia. Given abx for concern for sepsis at this time, D50/calcium gluc/albuterol for hyperK. Also given hydrocortisone for concern for adrenal insufficiency. Pt is now with 2L NS and 1L going in of D5, with SBP >100 currently, urine production on Urrutia insertion. Furthermore, pt is non-toxic appearing and mentating well throughout. Nephrology consult pending, unsuccessful at reaching them to this point, official consult placed. Patient accepted to ICU, they will also f/u the non-con CT's placed. 63yoF with h/o HTN, recently diagnosed lymphoma, on ASA/ramipril/Lasix, presents with generalized weakness, significantly decreased PO, and rhinorrhea/cough x3 days. Temp to 100.0F last night per daughter, who translates, and pt states only that her entire body aches her, no one place more than another, and no other acute complaints. On exam, febrile, hypotensive, saturating well on RA, NAD, chronic ill/cachectic appearance, non toxic appearing, head NCAT, EOMI grossly, anicteric, MM dry, no JVD, RRR, nml S1/S2, no m/r/g, lungs CTAB, no w/r/r, abd soft, NT, ND, nml BS, no rebound or guarding, AAO, CN's 3-12 grossly intact, VANCE spontaneously, no leg cyanosis or edema, skin warm, dry, no rashes or hives. No meningeal signs, cellulitis, e/o PNA on exam. UA pending. Patient with ADONIS with uremia, hyperkalemia with mildly peaked T waves, hypoglycemia. Given abx for concern for sepsis at this time, D50/calcium gluc/albuterol for hyperK. Also given hydrocortisone for concern for adrenal insufficiency. Pt is now with 2L NS and 1L going in of D5, with SBP >100 currently, urine production on Urrutia insertion. Furthermore, pt is non-toxic appearing and mentating well throughout. Nephrology consult pending, unsuccessful at reaching them to this point, official consult placed. Patient accepted to ICU, they will also f/u the non-con CT's placed. Also of note, after admission, bedside U/S shows pericholecystic fluid and GB wall edema with gallstones, concerning for cholecystitis with cholelithiasis. Surgery consulted and will evaluate pt.

## 2019-03-13 NOTE — CONSULT NOTE ADULT - ASSESSMENT
ASSESSMENT:  62 y/o F with PMHx lymphoma (diagnosed 1 week ago via neck mass, not on chemo), HTN, "heart failure" that resulted in fluid overload, presenting for nausea and vomiting, with poor oral intake, with above mentioned physical exam, LLQ tenderness, labs and imaging findings of aneurysmal dilation of the abdominal aorta up to 4.3 cm, gallbladder wall edema and perihepatic fluid, extensive retroperitoneal and pelvic lymphadenopathy with associated splenomegaly and +ve sono Lenz sign.    PLAN:   -MICU admission  -No surgical intervention at this timer.  -Continue IV Abx  -GI consult recommended  -Pain Control as needed  -Trend WBCs and liver enzymes  -F/U Sepsis work-up  -Hemodynamic monitoring as per MICU  -GI prophylaxis  -Check and replete CBC and BMP q daily  -Strict Input and output monitoring      03-13-19 @ 22:17 ASSESSMENT:  62 y/o F with PMHx lymphoma (diagnosed 1 week ago via neck mass, not on chemo), HTN, "heart failure" that resulted in fluid overload, presenting for nausea and vomiting, with poor oral intake, with above mentioned physical exam, LLQ tenderness, labs and imaging findings of aneurysmal dilation of the abdominal aorta up to 4.3 cm, gallbladder wall edema and perihepatic fluid, extensive retroperitoneal and pelvic lymphadenopathy with associated splenomegaly and +ve sono Lenz sign.    PLAN:   -MICU admission  -No surgical intervention at this timer.  -Continue IV Abx  -Pain Control as needed  -Trend WBCs and liver enzymes  -F/U Sepsis work-up  -Hemodynamic monitoring as per MICU  -Strict Input and output monitoring    03-13-19 @ 22:17    Senior Resident Note  Pt seen and examined, diffuse abd tenderness, elevated lact to 9.5, continue IV hydration, CT scan and RUQ U/S suspicious for cholecystitis, hemodynamically unstable, multi-organ failure with deranged LFTs and renal functions. Not a surgical candidate at this point  Above note has been reviewed and edited  Plan d/w patient and Dr. Fowler

## 2019-03-13 NOTE — ED PROCEDURE NOTE - ATTENDING CONTRIBUTION TO CARE
I was not physically present during this procedure, but I agree with its being done. (It states above that I was present only as that is a radio button that must be clicked in order for this note to be saved.)

## 2019-03-13 NOTE — CONSULT NOTE ADULT - SUBJECTIVE AND OBJECTIVE BOX
Chief Complaint:  Patient is a 63y old  Female who presents with a chief complaint of vomiting    HPI:   62 y/o female w/ pmhx of recently diagnosed lymphoma ( not on ttt), HTN,? CHF was sent in from an urgent care for further evaluation for hypotension. patient was recently diagnosed with lymphoma and was recently hospitalized in another facility with CHF exacerbation ( as per her daughter). Since Monday daughter noticed decrease PO intake then today she started to have non bloody vomitus.. Patient is a poor historian , I spoke with her using the  phone # [phone], she reported having diffuse body aches and diffuse abdominal discomfort.    As per the daughter patient had EGD and colonoscopy about 2 years ago, unsure of the results.  Allergies:  No Known Allergies    Hospital Medications:  dextrose 5% 1000 milliLiter(s) IV Continuous <Continuous>  norepinephrine Infusion 0.05 MICROgram(s)/kG/Min IV Continuous <Continuous>  piperacillin/tazobactam IVPB. 3.375 Gram(s) IV Intermittent once  vancomycin  IVPB 750 milliGRAM(s) IV Intermittent once  vasopressin Infusion 0.04 Unit(s)/Min IV Continuous <Continuous>      PMHX/PSHX:  Hypertension, unspecified type  Lymphoma, unspecified body region, unspecified lymphoma type  Weakness  S/P     ROS:   Unable to obtain, patient is a poor historian.      PHYSICAL EXAM:   Vital Signs:  Vital Signs Last 24 Hrs  T(C): 37.7 (13 Mar 2019 16:20), Max: 38.2 (13 Mar 2019 15:19)  T(F): 99.8 (13 Mar 2019 16:20), Max: 100.8 (13 Mar 2019 15:19)  HR: 69 (13 Mar 2019 16:20) (68 - 69)  BP: 102/49 (13 Mar 2019 16:20) (83/48 - 102/49)  BP(mean): --  RR: 17 (13 Mar 2019 16:20) (17 - 18)  SpO2: 100% (13 Mar 2019 16:20) (100% - 100%)  Daily     Daily     GENERAL:  Appears stated age, well-groomed, well-nourished, no distress  HEENT:  NC/AT,  conjunctivae clear and pink, no thyromegaly, nodules, adenopathy, no JVD, sclera -anicteric  CHEST:  Full & symmetric excursion, no increased effort, breath sounds clear  HEART:  Regular rhythm, S1, S2, no murmur/rub/S3/S4, no abdominal bruit, no edema  ABDOMEN:  Soft, non-tender, non-distended, normoactive bowel sounds,  no masses ,no hepato-splenomegaly,   SKIN:  No rash/erythema/ecchymoses/petechiae/wounds/abscess/warm/dry  NEURO:  Alert, oriented, no asterixis, no tremor, no encephalopathy    LABS:                        9.0    3.84  )-----------( 30       ( 13 Mar 2019 15:05 )             28.2     03-    138  |  98  |  82<HH>  ----------------------------<  35<LL>  6.0<HH>   |  13<L>  |  3.1<H>    Ca    9.3      13 Mar 2019 15:05  Mg     2.6         TPro  5.4<L>  /  Alb  3.4<L>  /  TBili  2.4<H>  /  DBili  x   /  AST  809<H>  /  ALT  149<H>  /  AlkPhos  846<H>  13    LIVER FUNCTIONS - ( 13 Mar 2019 15:05 )  Alb: 3.4 g/dL / Pro: 5.4 g/dL / ALK PHOS: 846 U/L / ALT: 149 U/L / AST: 809 U/L / GGT: x           PT/INR - ( 13 Mar 2019 15:05 )   PT: 15.00 sec;   INR: 1.31 ratio         PTT - ( 13 Mar 2019 15:05 )  PTT:43.3 sec  Urinalysis Basic - ( 13 Mar 2019 19:44 )    Color: Yellow / Appearance: Cloudy / S.015 / pH: x  Gluc: x / Ketone: Negative  / Bili: Negative / Urobili: 1.0 mg/dL   Blood: x / Protein: 30 mg/dL / Nitrite: Negative   Leuk Esterase: Negative / RBC: 3-5 /HPF / WBC x   Sq Epi: x / Non Sq Epi: Few /HPF / Bacteria: Few /HPF      Amylase Serum--      Lipase serum37       Ammonia--      Imaging:

## 2019-03-13 NOTE — ED PROVIDER NOTE - OBJECTIVE STATEMENT
Patient is a 64 y/o female w/ pmhx of recently diagnosed lymphoma, not on chemo/XRT, HTN, who presents from urgent care center for evaluation of weakness and hypotension. As per patient, has been weak and has had poor oral intake. + nausea/vomiting today, otherwise no focal symptoms. Denies dizziness/lightheadedness, cough, shortness of breath, chest pain, abdominal pain, fever/rigors/chills, or any other complaints.

## 2019-03-13 NOTE — H&P ADULT - NSICDXPASTMEDICALHX_GEN_ALL_CORE_FT
PAST MEDICAL HISTORY:  Hypertension, unspecified type     Lymphoma, unspecified body region, unspecified lymphoma type     Weakness

## 2019-03-13 NOTE — ED PROVIDER NOTE - NS ED ROS FT
Constitutional:  See HPI.  Eyes:  No visual changes, eye pain or discharge.  ENMT:  No hearing changes, pain, discharge or infections. No neck pain or stiffness.  Cardiac:  No chest pain, SOB or edema. No chest pain with exertion.  Respiratory:  No cough or respiratory distress. No hemoptysis. No history of asthma or RAD.  GI: See HPI.  :  No dysuria, frequency or burning.  MS:  No myalgia, muscle weakness, joint pain or back pain.  Neuro:  No headache or weakness.  No LOC.  Skin:  No skin rash.   Except as documented in the HPI,  all other systems are negative.

## 2019-03-13 NOTE — ED ADULT NURSE NOTE - CHIEF COMPLAINT QUOTE
pt sent in from [facility] [place], [place] for LOW b/p and vomiting and dizziness. diagnosed with lymphatic CA 1 week ago

## 2019-03-14 LAB
ALBUMIN SERPL ELPH-MCNC: 2.6 G/DL — LOW (ref 3.5–5.2)
ALBUMIN SERPL ELPH-MCNC: 2.9 G/DL — LOW (ref 3.5–5.2)
ALBUMIN SERPL ELPH-MCNC: 3.1 G/DL — LOW (ref 3.5–5.2)
ALP SERPL-CCNC: 589 U/L — HIGH (ref 30–115)
ALP SERPL-CCNC: 603 U/L — HIGH (ref 30–115)
ALP SERPL-CCNC: 605 U/L — HIGH (ref 30–115)
ALT FLD-CCNC: 106 U/L — HIGH (ref 0–41)
ALT FLD-CCNC: 107 U/L — HIGH (ref 0–41)
ALT FLD-CCNC: 119 U/L — HIGH (ref 0–41)
ANION GAP SERPL CALC-SCNC: 26 MMOL/L — HIGH (ref 7–14)
ANION GAP SERPL CALC-SCNC: 26 MMOL/L — HIGH (ref 7–14)
ANION GAP SERPL CALC-SCNC: 39 MMOL/L — HIGH (ref 7–14)
ANION GAP SERPL CALC-SCNC: SIGNIFICANT CHANGE UP MMOL/L (ref 7–14)
APTT BLD: 39.9 SEC — HIGH (ref 27–39.2)
APTT BLD: 43.8 SEC — HIGH (ref 27–39.2)
AST SERPL-CCNC: 644 U/L — HIGH (ref 0–41)
AST SERPL-CCNC: 653 U/L — HIGH (ref 0–41)
AST SERPL-CCNC: 668 U/L — HIGH (ref 0–41)
BASOPHILS # BLD AUTO: 0 K/UL — SIGNIFICANT CHANGE UP (ref 0–0.2)
BASOPHILS # BLD AUTO: 0.01 K/UL — SIGNIFICANT CHANGE UP (ref 0–0.2)
BASOPHILS NFR BLD AUTO: 0 % — SIGNIFICANT CHANGE UP (ref 0–1)
BASOPHILS NFR BLD AUTO: 0.3 % — SIGNIFICANT CHANGE UP (ref 0–1)
BILIRUB SERPL-MCNC: 2.6 MG/DL — HIGH (ref 0.2–1.2)
BILIRUB SERPL-MCNC: 3.3 MG/DL — HIGH (ref 0.2–1.2)
BILIRUB SERPL-MCNC: 3.3 MG/DL — HIGH (ref 0.2–1.2)
BLD GP AB SCN SERPL QL: SIGNIFICANT CHANGE UP
BUN SERPL-MCNC: 63 MG/DL — CRITICAL HIGH (ref 10–20)
BUN SERPL-MCNC: 74 MG/DL — CRITICAL HIGH (ref 10–20)
BUN SERPL-MCNC: 80 MG/DL — CRITICAL HIGH (ref 10–20)
BUN SERPL-MCNC: SIGNIFICANT CHANGE UP MG/DL (ref 10–20)
C DIFF BY PCR RESULT: POSITIVE
C DIFF TOX GENS STL QL NAA+PROBE: SIGNIFICANT CHANGE UP
CALCIUM SERPL-MCNC: 8.4 MG/DL — LOW (ref 8.5–10.1)
CALCIUM SERPL-MCNC: 8.7 MG/DL — SIGNIFICANT CHANGE UP (ref 8.5–10.1)
CALCIUM SERPL-MCNC: 8.8 MG/DL — SIGNIFICANT CHANGE UP (ref 8.5–10.1)
CALCIUM SERPL-MCNC: 8.8 MG/DL — SIGNIFICANT CHANGE UP (ref 8.5–10.1)
CHLORIDE SERPL-SCNC: 109 MMOL/L — SIGNIFICANT CHANGE UP (ref 98–110)
CHLORIDE SERPL-SCNC: 94 MMOL/L — LOW (ref 98–110)
CHLORIDE SERPL-SCNC: 94 MMOL/L — LOW (ref 98–110)
CHLORIDE SERPL-SCNC: 97 MMOL/L — LOW (ref 98–110)
CO2 SERPL-SCNC: 12 MMOL/L — LOW (ref 17–32)
CO2 SERPL-SCNC: 14 MMOL/L — LOW (ref 17–32)
CO2 SERPL-SCNC: 7 MMOL/L — CRITICAL LOW (ref 17–32)
CO2 SERPL-SCNC: SIGNIFICANT CHANGE UP MMOL/L (ref 17–32)
CREAT SERPL-MCNC: 2.3 MG/DL — HIGH (ref 0.7–1.5)
CREAT SERPL-MCNC: 2.3 MG/DL — HIGH (ref 0.7–1.5)
CREAT SERPL-MCNC: 2.4 MG/DL — HIGH (ref 0.7–1.5)
CREAT SERPL-MCNC: 2.5 MG/DL — HIGH (ref 0.7–1.5)
EOSINOPHIL # BLD AUTO: 0 K/UL — SIGNIFICANT CHANGE UP (ref 0–0.7)
EOSINOPHIL # BLD AUTO: 0 K/UL — SIGNIFICANT CHANGE UP (ref 0–0.7)
EOSINOPHIL NFR BLD AUTO: 0 % — SIGNIFICANT CHANGE UP (ref 0–8)
EOSINOPHIL NFR BLD AUTO: 0 % — SIGNIFICANT CHANGE UP (ref 0–8)
GAS PNL BLDA: SIGNIFICANT CHANGE UP
GAS PNL BLDA: SIGNIFICANT CHANGE UP
GGT SERPL-CCNC: 202 U/L — HIGH (ref 1–40)
GGT SERPL-CCNC: 203 U/L — HIGH (ref 1–40)
GLUCOSE BLDC GLUCOMTR-MCNC: 239 MG/DL — HIGH (ref 70–99)
GLUCOSE BLDC GLUCOMTR-MCNC: 27 MG/DL — CRITICAL LOW (ref 70–99)
GLUCOSE SERPL-MCNC: 132 MG/DL — HIGH (ref 70–99)
GLUCOSE SERPL-MCNC: 36 MG/DL — CRITICAL LOW (ref 70–99)
GLUCOSE SERPL-MCNC: 88 MG/DL — SIGNIFICANT CHANGE UP (ref 70–99)
GLUCOSE SERPL-MCNC: SIGNIFICANT CHANGE UP MG/DL (ref 70–99)
HCT VFR BLD CALC: 20.7 % — LOW (ref 37–47)
HCT VFR BLD CALC: 21.2 % — LOW (ref 37–47)
HCT VFR BLD CALC: 26.5 % — LOW (ref 37–47)
HGB BLD-MCNC: 6.9 G/DL — CRITICAL LOW (ref 12–16)
HGB BLD-MCNC: 7.2 G/DL — CRITICAL LOW (ref 12–16)
HGB BLD-MCNC: 8.8 G/DL — LOW (ref 12–16)
IMM GRANULOCYTES NFR BLD AUTO: 10.7 % — HIGH (ref 0.1–0.3)
IMM GRANULOCYTES NFR BLD AUTO: 11.8 % — HIGH (ref 0.1–0.3)
INR BLD: 1.21 RATIO — SIGNIFICANT CHANGE UP (ref 0.65–1.3)
INR BLD: 1.22 RATIO — SIGNIFICANT CHANGE UP (ref 0.65–1.3)
INR BLD: 1.34 RATIO — HIGH (ref 0.65–1.3)
LACTATE SERPL-SCNC: 13.3 MMOL/L — CRITICAL HIGH (ref 0.5–2.2)
LACTATE SERPL-SCNC: 22.7 MMOL/L — CRITICAL HIGH (ref 0.5–2.2)
LYMPHOCYTES # BLD AUTO: 0.4 K/UL — LOW (ref 1.2–3.4)
LYMPHOCYTES # BLD AUTO: 0.5 K/UL — LOW (ref 1.2–3.4)
LYMPHOCYTES # BLD AUTO: 10.1 % — LOW (ref 20.5–51.1)
LYMPHOCYTES # BLD AUTO: 13.4 % — LOW (ref 20.5–51.1)
MAGNESIUM SERPL-MCNC: 1.9 MG/DL — SIGNIFICANT CHANGE UP (ref 1.8–2.4)
MCHC RBC-ENTMCNC: 27.2 PG — SIGNIFICANT CHANGE UP (ref 27–31)
MCHC RBC-ENTMCNC: 27.3 PG — SIGNIFICANT CHANGE UP (ref 27–31)
MCHC RBC-ENTMCNC: 27.6 PG — SIGNIFICANT CHANGE UP (ref 27–31)
MCHC RBC-ENTMCNC: 33.2 G/DL — SIGNIFICANT CHANGE UP (ref 32–37)
MCHC RBC-ENTMCNC: 33.3 G/DL — SIGNIFICANT CHANGE UP (ref 32–37)
MCHC RBC-ENTMCNC: 34 G/DL — SIGNIFICANT CHANGE UP (ref 32–37)
MCV RBC AUTO: 81.2 FL — SIGNIFICANT CHANGE UP (ref 81–99)
MCV RBC AUTO: 81.5 FL — SIGNIFICANT CHANGE UP (ref 81–99)
MCV RBC AUTO: 82.3 FL — SIGNIFICANT CHANGE UP (ref 81–99)
MONOCYTES # BLD AUTO: 0.52 K/UL — SIGNIFICANT CHANGE UP (ref 0.1–0.6)
MONOCYTES # BLD AUTO: 0.53 K/UL — SIGNIFICANT CHANGE UP (ref 0.1–0.6)
MONOCYTES NFR BLD AUTO: 13.4 % — HIGH (ref 1.7–9.3)
MONOCYTES NFR BLD AUTO: 13.9 % — HIGH (ref 1.7–9.3)
MRSA PCR RESULT.: POSITIVE
NEUTROPHILS # BLD AUTO: 2.31 K/UL — SIGNIFICANT CHANGE UP (ref 1.4–6.5)
NEUTROPHILS # BLD AUTO: 2.56 K/UL — SIGNIFICANT CHANGE UP (ref 1.4–6.5)
NEUTROPHILS NFR BLD AUTO: 62 % — SIGNIFICANT CHANGE UP (ref 42.2–75.2)
NEUTROPHILS NFR BLD AUTO: 64.4 % — SIGNIFICANT CHANGE UP (ref 42.2–75.2)
NRBC # BLD: 0 /100 WBCS — SIGNIFICANT CHANGE UP (ref 0–0)
PLATELET # BLD AUTO: 19 K/UL — CRITICAL LOW (ref 130–400)
PLATELET # BLD AUTO: 21 K/UL — LOW (ref 130–400)
PLATELET # BLD AUTO: 49 K/UL — LOW (ref 130–400)
POTASSIUM SERPL-MCNC: 4.5 MMOL/L — SIGNIFICANT CHANGE UP (ref 3.5–5)
POTASSIUM SERPL-MCNC: 4.5 MMOL/L — SIGNIFICANT CHANGE UP (ref 3.5–5)
POTASSIUM SERPL-MCNC: 4.8 MMOL/L — SIGNIFICANT CHANGE UP (ref 3.5–5)
POTASSIUM SERPL-MCNC: 5.6 MMOL/L — HIGH (ref 3.5–5)
POTASSIUM SERPL-SCNC: 4.5 MMOL/L — SIGNIFICANT CHANGE UP (ref 3.5–5)
POTASSIUM SERPL-SCNC: 4.5 MMOL/L — SIGNIFICANT CHANGE UP (ref 3.5–5)
POTASSIUM SERPL-SCNC: 4.8 MMOL/L — SIGNIFICANT CHANGE UP (ref 3.5–5)
POTASSIUM SERPL-SCNC: 5.6 MMOL/L — HIGH (ref 3.5–5)
PROT SERPL-MCNC: 4.5 G/DL — LOW (ref 6–8)
PROT SERPL-MCNC: 4.9 G/DL — LOW (ref 6–8)
PROT SERPL-MCNC: 5.1 G/DL — LOW (ref 6–8)
PROTHROM AB SERPL-ACNC: 13.9 SEC — HIGH (ref 9.95–12.87)
PROTHROM AB SERPL-ACNC: 14 SEC — HIGH (ref 9.95–12.87)
PROTHROM AB SERPL-ACNC: 15.4 SEC — HIGH (ref 9.95–12.87)
RBC # BLD: 2.54 M/UL — LOW (ref 4.2–5.4)
RBC # BLD: 2.61 M/UL — LOW (ref 4.2–5.4)
RBC # BLD: 3.22 M/UL — LOW (ref 4.2–5.4)
RBC # FLD: 16.3 % — HIGH (ref 11.5–14.5)
RBC # FLD: 17.5 % — HIGH (ref 11.5–14.5)
RBC # FLD: 17.6 % — HIGH (ref 11.5–14.5)
SODIUM SERPL-SCNC: 137 MMOL/L — SIGNIFICANT CHANGE UP (ref 135–146)
SODIUM SERPL-SCNC: 140 MMOL/L — SIGNIFICANT CHANGE UP (ref 135–146)
SODIUM SERPL-SCNC: 141 MMOL/L — SIGNIFICANT CHANGE UP (ref 135–146)
SODIUM SERPL-SCNC: 147 MMOL/L — HIGH (ref 135–146)
TYPE + AB SCN PNL BLD: SIGNIFICANT CHANGE UP
WBC # BLD: 3.66 K/UL — LOW (ref 4.8–10.8)
WBC # BLD: 3.73 K/UL — LOW (ref 4.8–10.8)
WBC # BLD: 3.97 K/UL — LOW (ref 4.8–10.8)
WBC # FLD AUTO: 3.66 K/UL — LOW (ref 4.8–10.8)
WBC # FLD AUTO: 3.73 K/UL — LOW (ref 4.8–10.8)
WBC # FLD AUTO: 3.97 K/UL — LOW (ref 4.8–10.8)

## 2019-03-14 PROCEDURE — 93970 EXTREMITY STUDY: CPT | Mod: 26

## 2019-03-14 RX ORDER — SODIUM CHLORIDE 9 MG/ML
1000 INJECTION, SOLUTION INTRAVENOUS
Qty: 0 | Refills: 0 | Status: DISCONTINUED | OUTPATIENT
Start: 2019-03-14 | End: 2019-03-14

## 2019-03-14 RX ORDER — FUROSEMIDE 40 MG
40 TABLET ORAL ONCE
Qty: 0 | Refills: 0 | Status: COMPLETED | OUTPATIENT
Start: 2019-03-14 | End: 2019-03-14

## 2019-03-14 RX ORDER — VANCOMYCIN HCL 1 G
125 VIAL (EA) INTRAVENOUS EVERY 6 HOURS
Qty: 0 | Refills: 0 | Status: DISCONTINUED | OUTPATIENT
Start: 2019-03-14 | End: 2019-03-14

## 2019-03-14 RX ORDER — IOHEXOL 300 MG/ML
30 INJECTION, SOLUTION INTRAVENOUS ONCE
Qty: 0 | Refills: 0 | Status: COMPLETED | OUTPATIENT
Start: 2019-03-14 | End: 2019-03-14

## 2019-03-14 RX ORDER — CEFEPIME 1 G/1
2000 INJECTION, POWDER, FOR SOLUTION INTRAMUSCULAR; INTRAVENOUS DAILY
Qty: 0 | Refills: 0 | Status: DISCONTINUED | OUTPATIENT
Start: 2019-03-14 | End: 2019-03-17

## 2019-03-14 RX ORDER — DEXTROSE 50 % IN WATER 50 %
50 SYRINGE (ML) INTRAVENOUS ONCE
Qty: 0 | Refills: 0 | Status: COMPLETED | OUTPATIENT
Start: 2019-03-14 | End: 2019-03-14

## 2019-03-14 RX ORDER — SODIUM BICARBONATE 1 MEQ/ML
0.26 SYRINGE (ML) INTRAVENOUS
Qty: 150 | Refills: 0 | Status: DISCONTINUED | OUTPATIENT
Start: 2019-03-14 | End: 2019-03-15

## 2019-03-14 RX ORDER — FUROSEMIDE 40 MG
40 TABLET ORAL ONCE
Qty: 0 | Refills: 0 | Status: DISCONTINUED | OUTPATIENT
Start: 2019-03-14 | End: 2019-03-14

## 2019-03-14 RX ORDER — METRONIDAZOLE 500 MG
500 TABLET ORAL EVERY 8 HOURS
Qty: 0 | Refills: 0 | Status: DISCONTINUED | OUTPATIENT
Start: 2019-03-14 | End: 2019-03-14

## 2019-03-14 RX ORDER — METRONIDAZOLE 500 MG
TABLET ORAL
Qty: 0 | Refills: 0 | Status: DISCONTINUED | OUTPATIENT
Start: 2019-03-14 | End: 2019-03-14

## 2019-03-14 RX ORDER — METRONIDAZOLE 500 MG
500 TABLET ORAL EVERY 8 HOURS
Qty: 0 | Refills: 0 | Status: DISCONTINUED | OUTPATIENT
Start: 2019-03-14 | End: 2019-03-17

## 2019-03-14 RX ORDER — ONDANSETRON 8 MG/1
4 TABLET, FILM COATED ORAL ONCE
Qty: 0 | Refills: 0 | Status: COMPLETED | OUTPATIENT
Start: 2019-03-14 | End: 2019-03-14

## 2019-03-14 RX ORDER — PANTOPRAZOLE SODIUM 20 MG/1
8 TABLET, DELAYED RELEASE ORAL
Qty: 80 | Refills: 0 | Status: DISCONTINUED | OUTPATIENT
Start: 2019-03-14 | End: 2019-03-15

## 2019-03-14 RX ORDER — VANCOMYCIN HCL 1 G
250 VIAL (EA) INTRAVENOUS EVERY 6 HOURS
Qty: 0 | Refills: 0 | Status: DISCONTINUED | OUTPATIENT
Start: 2019-03-14 | End: 2019-03-17

## 2019-03-14 RX ORDER — MEROPENEM 1 G/30ML
1000 INJECTION INTRAVENOUS EVERY 12 HOURS
Qty: 0 | Refills: 0 | Status: DISCONTINUED | OUTPATIENT
Start: 2019-03-14 | End: 2019-03-14

## 2019-03-14 RX ORDER — MEROPENEM 1 G/30ML
500 INJECTION INTRAVENOUS EVERY 12 HOURS
Qty: 0 | Refills: 0 | Status: DISCONTINUED | OUTPATIENT
Start: 2019-03-14 | End: 2019-03-14

## 2019-03-14 RX ORDER — ACETAMINOPHEN 500 MG
1000 TABLET ORAL ONCE
Qty: 0 | Refills: 0 | Status: DISCONTINUED | OUTPATIENT
Start: 2019-03-14 | End: 2019-03-14

## 2019-03-14 RX ORDER — MORPHINE SULFATE 50 MG/1
1 CAPSULE, EXTENDED RELEASE ORAL ONCE
Qty: 0 | Refills: 0 | Status: DISCONTINUED | OUTPATIENT
Start: 2019-03-14 | End: 2019-03-15

## 2019-03-14 RX ADMIN — Medication 100 MILLIGRAM(S): at 05:53

## 2019-03-14 RX ADMIN — Medication 40 MILLIGRAM(S): at 05:30

## 2019-03-14 RX ADMIN — ONDANSETRON 4 MILLIGRAM(S): 8 TABLET, FILM COATED ORAL at 18:31

## 2019-03-14 RX ADMIN — Medication 50 MILLILITER(S): at 18:33

## 2019-03-14 RX ADMIN — IOHEXOL 30 MILLILITER(S): 300 INJECTION, SOLUTION INTRAVENOUS at 11:49

## 2019-03-14 RX ADMIN — Medication 100 MILLIGRAM(S): at 21:51

## 2019-03-14 RX ADMIN — MEROPENEM 100 MILLIGRAM(S): 1 INJECTION INTRAVENOUS at 02:00

## 2019-03-14 RX ADMIN — ONDANSETRON 4 MILLIGRAM(S): 8 TABLET, FILM COATED ORAL at 12:29

## 2019-03-14 RX ADMIN — CEFEPIME 100 MILLIGRAM(S): 1 INJECTION, POWDER, FOR SOLUTION INTRAMUSCULAR; INTRAVENOUS at 11:49

## 2019-03-14 RX ADMIN — PANTOPRAZOLE SODIUM 10 MG/HR: 20 TABLET, DELAYED RELEASE ORAL at 02:48

## 2019-03-14 RX ADMIN — SODIUM CHLORIDE 75 MILLILITER(S): 9 INJECTION, SOLUTION INTRAVENOUS at 18:33

## 2019-03-14 RX ADMIN — Medication 250 MILLIGRAM(S): at 09:54

## 2019-03-14 RX ADMIN — Medication 100 MILLIGRAM(S): at 01:00

## 2019-03-14 RX ADMIN — Medication 250 MILLIGRAM(S): at 06:09

## 2019-03-14 RX ADMIN — Medication 250 MILLIGRAM(S): at 18:32

## 2019-03-14 RX ADMIN — Medication 50 MILLILITER(S): at 18:32

## 2019-03-14 RX ADMIN — Medication 100 MILLIGRAM(S): at 15:21

## 2019-03-14 NOTE — CONSULT NOTE ADULT - SUBJECTIVE AND OBJECTIVE BOX
Patient is a 63y old  Female who presents with a chief complaint of Septic Shock (13 Mar 2019 22:16)      HPI:  62 y/o F with PMHx lymphoma (diagnosed 1 week ago via neck mass, not on chemo), HTN, "heart failure" that resulted in fluid overload, presenting for nausea and vomiting. After finding out about her diagnosis, she has not been eating a drinking and eating for the past 1 wk, but has been taking lasix 40mg daily. She woke up this morning feeling, nauseous, weak, and this then resulted in the pt going to . There she was hypotensive and sent to the ER. She was hypotensive, febrile in the ED. She received 5L bolus, was started on levo and vasopressin, a R IJ central line and A line was placed bc her bp on the cuff was labile. She had CT abd and RUQ sono which showed concern for cholecystitis. Surgery evaluated pt and was not impressed with clinical picture. GI evaluated pt at bedside and stated she is low risk for chloledochelithiasis and recommended percutaneous lisbeth if the labwork worsens. Pt has no hx of gallbladder dx,  no abd sx besides . (13 Mar 2019 20:45)      PAST MEDICAL & SURGICAL HISTORY:  Hypertension, unspecified type  Lymphoma, unspecified body region, unspecified lymphoma type  Weakness  S/P       SOCIAL HX:   Smoking      No                   ETOH                            Other    FAMILY HISTORY:  :  No known cardiovacular family hisotry     ROS:  See HPI     Allergies    No Known Allergies    Intolerances          PHYSICAL EXAM    ICU Vital Signs Last 24 Hrs  T(C): 36.4 (14 Mar 2019 05:00), Max: 38.2 (13 Mar 2019 15:19)  T(F): 97.5 (14 Mar 2019 05:00), Max: 100.8 (13 Mar 2019 15:19)  HR: 88 (14 Mar 2019 07:00) (68 - 98)  BP: 119/63 (14 Mar 2019 07:00) (68/41 - 119/65)  BP(mean): 80 (14 Mar 2019 07:00) (48 - 95)  ABP: 126/50 (14 Mar 2019 07:00) (108/46 - 142/64)  ABP(mean): 78 (14 Mar 2019 07:00) (58 - 256)  RR: 30 (14 Mar 2019 07:00) (15 - 36)  SpO2: 98% (14 Mar 2019 07:00) (98% - 100%)      General: In NAD   HEENT:  MANDA              Lymphatic system: No cervical LN   Lungs: Bilateral BS  Cardiovascular: Regular  Gastrointestinal: Soft, Positive BS. RUQ tenderness.    Musculoskeletal: No clubbing.  Moves all extremities.  Full range of motion   Skin: Warm.  Intact  Neurological: No motor or sensory deficit       19 @ 07:01  -  19 @ 07:00  --------------------------------------------------------  IN:    Cryoprecipitate: 123 mL    dextrose 5%: 1100 mL    IV PiggyBack: 400 mL    norepinephrine Infusion: 91 mL    pantoprazole Infusion: 50 mL    Platelets - Single Donor: 1010 mL    vasopressin Infusion: 24 mL  Total IN: 2798 mL    OUT:    Indwelling Catheter - Urethral: 865 mL  Total OUT: 865 mL    Total NET: 1933 mL          LABS:                          6.9    3.73  )-----------(        ( 14 Mar 2019 04:20 )             20.7                                               03-14    137  |  97<L>  |  80<HH>  ----------------------------<  132<H>  4.8   |  14<L>  |  2.4<H>    14 Mar 2019 04:20    137    |  97<L>  |  80<HH>  ----------------------------<  132<H>  4.8     |  14<L>  |  2.4<H>  13 Mar 2019 20:44    147<H>  |  109    |  74<HH>  ----------------------------<  88     5.6<H>   |  12<L>  |  2.5<H>    Ca    8.4<L>      14 Mar 2019 04:20  Ca    8.7        13 Mar 2019 20:44  Mg     2.6<H>     13 Mar 2019 15:05    TPro  4.5<L>  /  Alb  2.6<L>  /  TBili  2.6<H>  /  DBili  x      /  AST  668<H>  /  ALT  119<H>  /  AlkPhos  603<H>  14 Mar 2019 04:20  TPro  3.9<L>  /  Alb  2.7<L>  /  TBili  2.0<H>  /  DBili  1.9<H>  /  AST  637<H>  /  ALT  114<H>  /  AlkPhos  627<H>  13 Mar 2019 20:44      Ca    8.4<L>      14 Mar 2019 04:20  Mg     2.6         TPro  4.5<L>  /  Alb  2.6<L>  /  TBili  2.6<H>  /  DBili  x   /  AST  668<H>  /  ALT  119<H>  /  AlkPhos  603<H>        PT/INR - ( 14 Mar 2019 04:20 )   PT: 15.40 sec;   INR: 1.34 ratio         PTT - ( 14 Mar 2019 04:20 )  PTT:43.8 sec                                       Urinalysis Basic - ( 13 Mar 2019 19:44 )    Color: Yellow / Appearance: Cloudy / S.015 / pH: x  Gluc: x / Ketone: Negative  / Bili: Negative / Urobili: 1.0 mg/dL   Blood: x / Protein: 30 mg/dL / Nitrite: Negative   Leuk Esterase: Negative / RBC: 3-5 /HPF / WBC x   Sq Epi: x / Non Sq Epi: Few /HPF / Bacteria: Few /HPF        CARDIAC MARKERS ( 13 Mar 2019 20:44 )  x     / x     / 53 U/L / x     / x      CARDIAC MARKERS ( 13 Mar 2019 15:05 )  x     / <0.01 ng/mL / x     / x     / x                                                LIVER FUNCTIONS - ( 14 Mar 2019 04:20 )  Alb: 2.6 g/dL / Pro: 4.5 g/dL / ALK PHOS: 603 U/L / ALT: 119 U/L / AST: 668 U/L / GGT: x                                                                                                                                       X-Rays       No infiltrate                                                                               ECHO	GDE moderate LV Dysfunction     MEDICATIONS  (STANDING):  dextrose 5% 1000 milliLiter(s) (100 mL/Hr) IV Continuous <Continuous>  dextrose 5% 1000 milliLiter(s) (100 mL/Hr) IV Continuous <Continuous>  meropenem  IVPB 1000 milliGRAM(s) IV Intermittent every 12 hours  metroNIDAZOLE  IVPB 500 milliGRAM(s) IV Intermittent every 8 hours  norepinephrine Infusion 0.05 MICROgram(s)/kG/Min (3.797 mL/Hr) IV Continuous <Continuous>  pantoprazole Infusion 8 mG/Hr (10 mL/Hr) IV Continuous <Continuous>  vancomycin  IVPB 750 milliGRAM(s) IV Intermittent every 12 hours  vasopressin Infusion 0.04 Unit(s)/Min (2.4 mL/Hr) IV Continuous <Continuous>    MEDICATIONS  (PRN):

## 2019-03-14 NOTE — PROGRESS NOTE ADULT - SUBJECTIVE AND OBJECTIVE BOX
SUBJECTIVE:    Patient is a 63y old Female who presents with a chief complaint of septic shock (14 Mar 2019 08:37)      HPI:  62 y/o F with PMHx lymphoma (diagnosed 1 week ago via neck mass, not on chemo), HTN, "heart failure" that resulted in fluid overload, presenting for nausea and vomiting. After finding out about her diagnosis, she has not been eating a drinking and eating for the past 1 wk, but has been taking lasix 40mg daily. She woke up this morning feeling, nauseous, weak, and this then resulted in the pt going to . There she was hypotensive and sent to the ER. She was hypotensive, febrile in the ED. She received 5L bolus, was started on levo and vasopressin, a R IJ central line and A line was placed bc her bp on the cuff was labile. She had CT abd and RUQ sono which showed concern for cholecystitis. Surgery evaluated pt and was not impressed with clinical picture. GI evaluated pt at bedside and stated she is low risk for chloledochelithiasis and recommended percutaneous lisbeth if the labwork worsens. Pt has no hx of gallbladder dx,  no abd sx besides . (13 Mar 2019 20:45)      Currently admitted to medicine with the primary diagnosis of Severe sepsis     Comfortable right now, received 1 prbc, 4 units platelets, and 5 units cryo.    Besides the pertinent positives and negatives described above, the ROS was within normal limits.    PAST MEDICAL & SURGICAL HISTORY  Hypertension, unspecified type  Lymphoma, unspecified body region, unspecified lymphoma type  Weakness  S/P     SOCIAL HISTORY:    ALLERGIES:  No Known Allergies    MEDICATIONS:  STANDING MEDICATIONS  cefepime   IVPB 2000 milliGRAM(s) IV Intermittent daily  metroNIDAZOLE  IVPB 500 milliGRAM(s) IV Intermittent every 8 hours  norepinephrine Infusion 0.05 MICROgram(s)/kG/Min IV Continuous <Continuous>  ondansetron Injectable 4 milliGRAM(s) IV Push once  pantoprazole Infusion 8 mG/Hr IV Continuous <Continuous>  vancomycin    Solution 250 milliGRAM(s) Oral every 6 hours    PRN MEDICATIONS    VITALS:   T(F): 100  HR: 94  BP: 110/53  RR: 29  SpO2: 97%    LABS:                        6.9    3.73  )-----------( 19       ( 14 Mar 2019 04:20 )             20.7     03-14    137  |  97<L>  |  80<HH>  ----------------------------<  132<H>  4.8   |  14<L>  |  2.4<H>    Ca    8.4<L>      14 Mar 2019 04:20  Mg     2.6         TPro  4.5<L>  /  Alb  2.6<L>  /  TBili  2.6<H>  /  DBili  x   /  AST  668<H>  /  ALT  119<H>  /  AlkPhos  603<H>      PT/INR - ( 14 Mar 2019 04:20 )   PT: 15.40 sec;   INR: 1.34 ratio         PTT - ( 14 Mar 2019 04:20 )  PTT:43.8 sec  Urinalysis Basic - ( 13 Mar 2019 19:44 )    Color: Yellow / Appearance: Cloudy / S.015 / pH: x  Gluc: x / Ketone: Negative  / Bili: Negative / Urobili: 1.0 mg/dL   Blood: x / Protein: 30 mg/dL / Nitrite: Negative   Leuk Esterase: Negative / RBC: 3-5 /HPF / WBC x   Sq Epi: x / Non Sq Epi: Few /HPF / Bacteria: Few /HPF      ABG - ( 14 Mar 2019 09:00 )  pH, Arterial: 7.32  pH, Blood: x     /  pCO2: 21    /  pO2: 95    / HCO3: 11    / Base Excess: -13.9 /  SaO2: 97                Lactate, Blood: 13.3 mmol/L <HH> (19 @ 04:20)  Creatine Kinase, Serum: 53 U/L (19 @ 20:44)  Lactate, Blood: 11.7 mmol/L <HH> (19 @ 19:34)  Troponin T, Serum: <0.01 ng/mL (19 @ 15:05)      CARDIAC MARKERS ( 13 Mar 2019 20:44 )  x     / x     / 53 U/L / x     / x      CARDIAC MARKERS ( 13 Mar 2019 15:05 )  x     / <0.01 ng/mL / x     / x     / x          RADIOLOGY:    PHYSICAL EXAM:  GEN: No acute distress  LUNGS: Clear to auscultation bilaterally   HEART: Regular  ABD: Soft, non-tender, non-distended.  EXT: NC/NC/NE/2+PP/VANCE/Skin Intact.   NEURO: AAOX3    Intravenous access: yes  NG tube: no  Urrutia Catheter:   Indwelling Urethral Catheter:     Connect To:  Straight Drainage/[place]    Indication:  Urinary Retention / Obstruction (19 @ 09:47) (not performed) [active] SUBJECTIVE:    Patient is a 63y old Female who presents with a chief complaint of septic shock (14 Mar 2019 08:37)      HPI:  64 y/o F with PMHx lymphoma (diagnosed 1 week ago via neck mass, not on chemo), HTN, "heart failure" that resulted in fluid overload, presenting for nausea and vomiting. After finding out about her diagnosis, she has not been eating a drinking and eating for the past 1 wk, but has been taking lasix 40mg daily. She woke up this morning feeling, nauseous, weak, and this then resulted in the pt going to . There she was hypotensive and sent to the ER. She was hypotensive, febrile in the ED. She received 5L bolus, was started on levo and vasopressin, a R IJ central line and A line was placed bc her bp on the cuff was labile. She had CT abd and RUQ sono which showed concern for cholecystitis. Surgery evaluated pt and was not impressed with clinical picture. GI evaluated pt at bedside and stated she is low risk for chloledochelithiasis and recommended percutaneous lisbeth if the labwork worsens. Pt has no hx of gallbladder dx,  no abd sx besides . (13 Mar 2019 20:45)      Currently admitted to medicine with the primary diagnosis of Severe sepsis     Comfortable right now, received 1 prbc, 4 units platelets, and 5 units cryo.    Besides the pertinent positives and negatives described above, the ROS was within normal limits.    PAST MEDICAL & SURGICAL HISTORY  Hypertension, unspecified type  Lymphoma, unspecified body region, unspecified lymphoma type  Weakness  S/P     SOCIAL HISTORY:    ALLERGIES:  No Known Allergies    MEDICATIONS:  STANDING MEDICATIONS  cefepime   IVPB 2000 milliGRAM(s) IV Intermittent daily  metroNIDAZOLE  IVPB 500 milliGRAM(s) IV Intermittent every 8 hours  norepinephrine Infusion 0.05 MICROgram(s)/kG/Min IV Continuous <Continuous>  ondansetron Injectable 4 milliGRAM(s) IV Push once  pantoprazole Infusion 8 mG/Hr IV Continuous <Continuous>  vancomycin    Solution 250 milliGRAM(s) Oral every 6 hours    PRN MEDICATIONS    VITALS:   T(F): 100  HR: 94  BP: 110/53  RR: 29  SpO2: 97%    LABS:                        6.9    3.73  )-----------( 19       ( 14 Mar 2019 04:20 )             20.7     03-14    137  |  97<L>  |  80<HH>  ----------------------------<  132<H>  4.8   |  14<L>  |  2.4<H>    Ca    8.4<L>      14 Mar 2019 04:20  Mg     2.6         TPro  4.5<L>  /  Alb  2.6<L>  /  TBili  2.6<H>  /  DBili  x   /  AST  668<H>  /  ALT  119<H>  /  AlkPhos  603<H>      PT/INR - ( 14 Mar 2019 04:20 )   PT: 15.40 sec;   INR: 1.34 ratio         PTT - ( 14 Mar 2019 04:20 )  PTT:43.8 sec  Urinalysis Basic - ( 13 Mar 2019 19:44 )    Color: Yellow / Appearance: Cloudy / S.015 / pH: x  Gluc: x / Ketone: Negative  / Bili: Negative / Urobili: 1.0 mg/dL   Blood: x / Protein: 30 mg/dL / Nitrite: Negative   Leuk Esterase: Negative / RBC: 3-5 /HPF / WBC x   Sq Epi: x / Non Sq Epi: Few /HPF / Bacteria: Few /HPF      ABG - ( 14 Mar 2019 09:00 )  pH, Arterial: 7.32  pH, Blood: x     /  pCO2: 21    /  pO2: 95    / HCO3: 11    / Base Excess: -13.9 /  SaO2: 97                Lactate, Blood: 13.3 mmol/L <HH> (19 @ 04:20)  Creatine Kinase, Serum: 53 U/L (19 @ 20:44)  Lactate, Blood: 11.7 mmol/L <HH> (19 @ 19:34)  Troponin T, Serum: <0.01 ng/mL (19 @ 15:05)      CARDIAC MARKERS ( 13 Mar 2019 20:44 )  x     / x     / 53 U/L / x     / x      CARDIAC MARKERS ( 13 Mar 2019 15:05 )  x     / <0.01 ng/mL / x     / x     / x          RADIOLOGY:    PHYSICAL EXAM:  GEN: mild distress  LUNGS: Clear to auscultation bilaterally   HEART: Regular, tachycardic  ABD: tense, extremely tender worse in RUQ  EXT: non cyanotic/NE/2+PP/VANCE/Skin Intact.   NEURO: AAOX3    Intravenous access: yes  NG tube: no  Urrutia Catheter:   Indwelling Urethral Catheter:     Connect To:  Straight Drainage/[place]    Indication:  Urinary Retention / Obstruction (19 @ 09:47) (not performed) [active]

## 2019-03-14 NOTE — PROGRESS NOTE ADULT - ASSESSMENT
63 yoF with pmhx of lymphoma diagnosed 1 week ago, and chf with unspecified EF presented in septic shock 2/2 likely abdominal process as of yet unspecified 63 yoF with pmhx of lymphoma diagnosed 1 week ago, and chf with unspecified EF presented in septic shock 2/2 likely abdominal process as of yet unspecified    GASTROINTESTINAL  # Septic shock 2/2 likely abdominal process  -exquisite RUQ tenderness / abd ultrasound shows pericholecystic fluid and positive monroy's sign consistent with acute cholecystitis / getting CT abd/pelvis 63 yoF with pmhx of lymphoma diagnosed 1 week ago, and chf with unspecified EF presented in septic shock 2/2 likely abdominal process as of yet unspecified    GASTROINTESTINAL  # Septic shock 2/2 likely abdominal process  -hgb on admission was 7.0, received 1 unit prbc / 4 units platelets / 5 cryo  -hgb currently 8.8, platelets 49, INR 1.21, lactate 21  -exquisite RUQ tenderness and surgical abdomen / abd ultrasound shows pericholecystic fluid and positive monroy's sign consistent with acute cholecystitis / prelim read of CT a/p shows minimal colitis no perforation  -too unstable for surgery as per Dr. Fowler, IR consulted will f/u    NEUROLOGICAL  -AAOx3, non focal    RESPIRATORY  -wnl, no distress    CARDIOVASCULAR  # hx of heart failure unspecified  -f/u echo, f/u LE duplex    RENAL  #ADONIS likely prerenal 2/2 septic shock  -unknown baseline, Cr on admission was 3.1, current Cr is 2.4  -patient has been getting fluids and blood products here  -nephro consulted    # Hematology 63 yoF with pmhx of lymphoma diagnosed 1 week ago, and chf with unspecified EF presented in septic shock 2/2 likely abdominal process as of yet unspecified    GASTROINTESTINAL  # Septic shock 2/2 cholecystitis  -hgb on admission was 7.0, received 1 unit prbc / 4 units platelets / 5 cryo  -hgb currently 8.8, platelets 49, INR 1.21, lactate 21  -exquisite RUQ tenderness and surgical abdomen / abd ultrasound shows pericholecystic fluid and positive monroy's sign consistent with acute cholecystitis / prelim read of CT a/p shows minimal colitis no perforation  -too unstable for surgery as per Dr. Fowler, IR will do percutaneous cholecystostomy    HEMATOLOGY  # Likely DIC 2/2 septic shock 2/2 cholecystitis  -hgb 8.8, received 1 unit prbc 4 units platelets and 5 cryo  -platelets 49  -f/u cbc's after IR procedure    RENAL  #ADONIS likely prerenal 2/2 septic shock  -unknown baseline, Cr on admission was 3.1, current Cr is 2.4  -patient has been getting fluids and blood products here  -pH decreasing with increasing lactate, f/u abg's after procedure  -nephro consulted    NEUROLOGICAL  -AAOx3, non focal    RESPIRATORY  -wnl, no distress    CARDIOVASCULAR  # hx of heart failure unspecified  -f/u echo, f/u LE duplex    FEEDING/ELECTROLYTES/NUTRITION  -currently NPO except medications, going for procedure  -sodium/potassium wnl    PLAN: Pt going for IR perc lisbeth right now.  F/u abg (from a line) and lactate q4h, f/u fluid cultures, monitor for signs of increasing sepsis, may need fluids/pressors/blood again, high risk procedure as per IR    # DVT PPX on scds  # GI PPX protonix infusion

## 2019-03-14 NOTE — CONSULT NOTE ADULT - SUBJECTIVE AND OBJECTIVE BOX
INTERVENTIONAL RADIOLOGY CONSULT:     Procedure Requested: percutaneous cholecystostomy tube placement     HPI:  62 y/o F with PMHx lymphoma (diagnosed 1 week ago via neck mass, not on chemo), HTN, "heart failure" that resulted in fluid overload, presenting for nausea and vomiting. After finding out about her diagnosis, she has not been eating a drinking and eating for the past 1 wk, but has been taking lasix 40mg daily. She woke up this morning feeling, nauseous, weak, and this then resulted in the pt going to . There she was hypotensive and sent to the ER. She was hypotensive, febrile in the ED. She received 5L bolus, was started on levo and vasopressin, a R IJ central line and A line was placed bc her bp on the cuff was labile. She had CT abd and RUQ sono which showed concern for cholecystitis. Surgery evaluated pt and was not impressed with clinical picture. GI evaluated pt at bedside and stated she is low risk for chloledochelithiasis and recommended percutaneous lisbeth if the labwork worsens. Pt has no hx of gallbladder dx,  no abd sx besides . (13 Mar 2019 20:45)      PAST MEDICAL & SURGICAL HISTORY:  Hypertension, unspecified type  Lymphoma, unspecified body region, unspecified lymphoma type  Weakness  S/P       MEDICATIONS  (STANDING):  dextrose 5% 1000 milliLiter(s) (100 mL/Hr) IV Continuous <Continuous>  dextrose 5% 1000 milliLiter(s) (100 mL/Hr) IV Continuous <Continuous>  meropenem  IVPB 1000 milliGRAM(s) IV Intermittent every 12 hours  metroNIDAZOLE  IVPB 500 milliGRAM(s) IV Intermittent every 8 hours  norepinephrine Infusion 0.05 MICROgram(s)/kG/Min (3.797 mL/Hr) IV Continuous <Continuous>  pantoprazole Infusion 8 mG/Hr (10 mL/Hr) IV Continuous <Continuous>  vancomycin  IVPB 750 milliGRAM(s) IV Intermittent every 12 hours  vasopressin Infusion 0.04 Unit(s)/Min (2.4 mL/Hr) IV Continuous <Continuous>    MEDICATIONS  (PRN):      Allergies    No Known Allergies    Intolerances        Social History:   Smoking: Yes [ ]  No [ ]   ______pk yrs  ETOH  Yes [ ]  No [ ]  Social [ ]  DRUGS:  Yes [ ]  No [ ]  if so what______________    FAMILY HISTORY:      Physical Exam:   Vital Signs Last 24 Hrs  T(C): 36.4 (14 Mar 2019 05:00), Max: 38.2 (13 Mar 2019 15:19)  T(F): 97.5 (14 Mar 2019 05:00), Max: 100.8 (13 Mar 2019 15:19)  HR: 88 (14 Mar 2019 07:00) (68 - 98)  BP: 119/63 (14 Mar 2019 07:00) (68/41 - 119/65)  BP(mean): 80 (14 Mar 2019 07:00) (48 - 95)  RR: 30 (14 Mar 2019 07:00) (15 - 36)  SpO2: 98% (14 Mar 2019 07:00) (98% - 100%)    Labs:                         6.9    3.73  )-----------(        ( 14 Mar 2019 04:20 )             20.7     03-14    137  |  97<L>  |  80<HH>  ----------------------------<  132<H>  4.8   |  14<L>  |  2.4<H>    Ca    8.4<L>      14 Mar 2019 04:20  Mg     2.6     03-13    TPro  4.5<L>  /  Alb  2.6<L>  /  TBili  2.6<H>  /  DBili  x   /  AST  668<H>  /  ALT  119<H>  /  AlkPhos  603<H>  03-14    PT/INR - ( 14 Mar 2019 04:20 )   PT: 15.40 sec;   INR: 1.34 ratio         PTT - ( 14 Mar 2019 04:20 )  PTT:43.8 sec    Pertinent labs:                      6.9    3.73  )-----------(        ( 14 Mar 2019 04:20 )             20.7       03-14    137  |  97<L>  |  80<HH>  ----------------------------<  132<H>  4.8   |  14<L>  |  2.4<H>    Ca    8.4<L>      14 Mar 2019 04:20  Mg     2.6     03-13    TPro  4.5<L>  /  Alb  2.6<L>  /  TBili  2.6<H>  /  DBili  x   /  AST  668<H>  /  ALT  119<H>  /  AlkPhos  603<H>  03      PT/INR - ( 14 Mar 2019 04:20 )   PT: 15.40 sec;   INR: 1.34 ratio         PTT - ( 14 Mar 2019 04:20 )  PTT:43.8 sec    Radiology & Additional Studies:     < from: CT Abdomen and Pelvis No Cont (19 @ 18:22) >  IMPRESSION:     1. Aneurysmal dilation of the abdominal aorta up to 4.3 cm with a focal   area of crescentic wall density suggestive of an intramural hematoma.    2. Gallbladder wall edema and perihepatic fluid. Findings suggestive of   underlying cholecystitis.    3. Extensive retroperitoneal and pelvic lymphadenopathy with associated   splenomegaly. Findings compatible with underlying lymphoma.    4. Air and fluid collection in the anterior thigh measuring up to 5.2 cm.    < end of copied text >    < from: US Abdomen Limited (19 @ 20:01) >    IMPRESSION:    Cholelithiasis with pericholecystic fluid and positive Lenz's sign.   Findings compatible with acute cholecystitis.    Upper abdominal ascites.    < end of copied text >    Radiology imaging reviewed.       ASSESSMENT/ PLAN:   - consulted for percutaneous cholecystostomy   - patient has hx of septic shock with associated DIC - r/o gallbladder as possible source  - CT and US reviewed - confirms gallbladder wall edema and perihepatic fluid, cholelithiasis with pericholecystic fluid   - would consider perc lisbeth as long as coagulopathy is reversed   - re-consult once coags are corrected   - will continue to follow     Thank you for the courtesy of this consult, please call [phone] with any further questions.

## 2019-03-14 NOTE — CONSULT NOTE ADULT - ASSESSMENT
IMPRESSION:    Sepsis septic shock  Cholecystitis.  Recent DX of Lymphoma  Lactic acidosis improving      PLAN:    CNS: No depressants.  Pian control     HEENT: Oral care    PULMONARY:  HOB @ 45 degrees    CARDIOVASCULAR: Wean Levophed  CE.  ECHO.  PH Q4 while on NaHCO3    GI: GI prophylaxis.  NPO.  GI Evaluation.  IR for Perc Merlyn    RENAL:  Follow up lytes.  Correct as needed	    INFECTIOUS DISEASE: Follow up cultures.  CODY Flagyl.      HEMATOLOGICAL:  DVT prophylaxis.  FU CBC and coags     ENDOCRINE:  Follow up FS.  Insulin protocol if needed.    MUSCULOSKELETAL:    CODY Urrutia IMPRESSION:    Sepsis septic shock  Cholecystitis.  Recent DX of Lymphoma  Lactic acidosis worsening       PLAN:    CNS: No depressants.  Pian control     HEENT: Oral care    PULMONARY:  HOB @ 45 degrees    CARDIOVASCULAR: Wean Levophed  CE.  ECHO.  ABG Q4.  Hold NaHCo3.      GI: GI prophylaxis.  NPO.  GI Evaluation.  Surgery Eval STAT. DW IR.  Might need repeat CT     RENAL:  Follow up lytes.  Correct as needed	    INFECTIOUS DISEASE: Follow up cultures.  DC Flagyl.      HEMATOLOGICAL:  DVT prophylaxis.  FU CBC and coags     ENDOCRINE:  Follow up FS.  Insulin protocol if needed.    MUSCULOSKELETAL:    Keep Urrutia IMPRESSION:    Sepsis septic shock  Cholecystitis.  CDiff   Recent DX of Lymphoma  Lactic acidosis worsening       PLAN:    CNS: No depressants.  Pian control     HEENT: Oral care    PULMONARY:  HOB @ 45 degrees    CARDIOVASCULAR: Wean Levophed  CE.  ECHO.  ABG Q4.  Hold NaHCo3.      GI: GI prophylaxis.  NPO.  GI Evaluation.  Surgery Eval STAT. DW IR.  Might need repeat CT     RENAL:  Follow up lytes.  Correct as needed	    INFECTIOUS DISEASE: Follow up cultures.  Cefipime and Flogyl.  Vanc 250 Q6    HEMATOLOGICAL:  DVT prophylaxis.  FU CBC and coags     ENDOCRINE:  Follow up FS.  Insulin protocol if needed.    MUSCULOSKELETAL:  OOB to chair     Keep Renan DUDLEY IR and Surgery  Gave.  To0 repeat CT Abdomen with gastrographin

## 2019-03-14 NOTE — PROGRESS NOTE ADULT - ASSESSMENT
64 y/o female w/ PMHx of recently diagnosed lymphoma ( not on ttt), HTN, CHF was sent in from an urgent care for further evaluation for hypotension. Patient was admitted to ICU for sepsis and GI was consulted for concern of GI source. CT imaging shows Pan Colitis and patient tested positive for C-Diff. Imaging also suggestive of Cholecystitis without obstructive pattern. Patient remains in ICU.       Septic Shock  - Treat C- Diff, On Vancomycin   - Agree with broad spectrum ABX  - IV hydration  - LFT pattern likely form sepsis  - Will follow

## 2019-03-14 NOTE — PROGRESS NOTE ADULT - SUBJECTIVE AND OBJECTIVE BOX
INTERVENTIONAL RADIOLOGY BRIEF-OPERATIVE NOTE    Procedure: percutaneous cholecystostomy    Pre-Op Diagnosis: cholecystitis    Post-Op Diagnosis: same    Attending: Jordan Marcos MD  Resident: Edgardo Bell MD; Paxton Lopez MD    Anesthesia (type):  [ ] General Anesthesia  [x] Sedation  [ ] Spinal Anesthesia  [x] Local/Regional    Contrast: None    Estimated Blood Loss: Minimal, < 5 cc    Condition:   [ ] Critical  [ ] Serious  [x] Fair   [ ] Good    Findings/Follow up Plan of Care: status post placement of an 8.5 [place] percutaneous cholecystostomy.    Specimens Removed: approximately 12 cc thick black bilious fluid removed and sent for laboratory analysis.    Implants: 8.5 [place] pigtail catheter    Complications: none    Disposition: return to previous level of care with management per primary team      Please call Interventional Radiology [phone] with any questions, concerns, or issues.

## 2019-03-14 NOTE — CONSULT NOTE ADULT - ASSESSMENT
Patient with ADONIS presented to hospital for nausea and vomiting.  PMH lymphoma (diagnosed 1 week ago via neck mass, not on chemo), HTN, "heart failure"    # ADONIS   - likely prerenal   - serum creatinine trending down since admission   - CT abdomen noted for right atrophic kidney, no hydro.   - ABG suggestive of high AG metabolic acidosis plus respiratory alkalosis plus likely metabolic alkalosis on delta/delta. serum lactate progressively increasing   - off pressors and bicarb drip now. s/p single dose of lasix today   - No Diuretics, no IVF for now   - check urine creatinine, Na and urea   - Maintain good hydration. encourage oral intake   - monitor I/O   - Repeat BMP, trend creatinine   - check ECHO   - Pancytopenia noted, can be due to sepsis. consider hem/onc eval   - C. Diff +ve. cont. ABx, consider ID eval.   - CT noted for cholecystitis, seen by GI, notes appreciated.   - No need for RRT   - Avoid nephrotoxins and hypotension   - will follow Patient with ADONIS presented to hospital for nausea and vomiting.  PMH lymphoma (diagnosed 1 week ago via neck mass, not on chemo), HTN, "heart failure"    # ADONIS   - likely prerenal   - serum creatinine trending down since admission   - CT abdomen noted for right atrophic kidney, no hydro.   - ABG suggestive of high AG metabolic acidosis plus respiratory alkalosis plus likely metabolic alkalosis on delta/delta. serum lactate progressively increasing   - off pressors and bicarb drip now. s/p single dose of lasix today   - No Diuretics, no IVF for now   - check urine creatinine, Na and urea   - Maintain good hydration. encourage oral intake   - monitor I/O   - Repeat BMP, trend creatinine   - Pancytopenia noted, can be due to sepsis. consider hem/onc eval   - C. Diff +ve. cont. ABx, consider ID eval.   - CT noted for cholecystitis, seen by GI, notes appreciated.   - No need for RRT   - Avoid nephrotoxins and hypotension   - will follow

## 2019-03-14 NOTE — CONSULT NOTE ADULT - SUBJECTIVE AND OBJECTIVE BOX
NEPHROLOGY CONSULTATION NOTE    Patient does not speak english, hx taken from chart and daughter at bedside.  64 y/o F with PMHx lymphoma (diagnosed 1 week ago via neck mass, not on chemo), HTN, "heart failure" that resulted in fluid overload, presenting for nausea and vomiting. After finding out about her diagnosis, she has not been eating a drinking and eating for the past 1 wk, but has been taking lasix 40mg daily. She woke up this morning feeling, nauseous, weak, and this then resulted in the pt going to . There she was hypotensive and sent to the ER. She was hypotensive, febrile in the ED. She received 5L bolus, was started on levo and vasopressin, a R IJ central line and A line was placed bc her bp on the cuff was labile. She had CT abd and RUQ sono which showed concern for cholecystitis. Surgery evaluated pt and was not impressed with clinical picture. GI evaluated pt at bedside and stated she is low risk for chloledochelithiasis and recommended percutaneous lisbeth if the labwork worsens. Pt has no hx of gallbladder dx,  no abd sx besides . (3/13/19)    Daughter mentions that she has difficulty in swallowing saliva for many years, but her work up for swallowing is always normal. Daughter also mentioned that her weight has not changed recently and been around ~ 90 - 100 lbs for many years.    Patient is C. Diff positive on labs.  Off pressors today. s/p single dose of lasix today. bicarb drip d/linus today.    PAST MEDICAL & SURGICAL HISTORY:  Hypertension, unspecified type  Lymphoma, unspecified body region, unspecified lymphoma type  Weakness  S/P     Allergies:  No Known Allergies    Home Medications:  aspirin 81 mg oral tablet, chewable: 1 tab(s) orally once a day (13 Mar 2019 21:04)  furosemide 40 mg oral tablet: 1 tab(s) orally once a day (13 Mar 2019 21:04)  ramipril 10 mg oral capsule: 1 cap(s) orally once a day (13 Mar 2019 21:04)    Hospital Medications:   MEDICATIONS  (STANDING):  cefepime   IVPB 2000 milliGRAM(s) IV Intermittent daily  metroNIDAZOLE  IVPB 500 milliGRAM(s) IV Intermittent every 8 hours  norepinephrine Infusion 0.05 MICROgram(s)/kG/Min (3.797 mL/Hr) IV Continuous <Continuous>  pantoprazole Infusion 8 mG/Hr (10 mL/Hr) IV Continuous <Continuous>  vancomycin    Solution 250 milliGRAM(s) Oral every 6 hours      SOCIAL HISTORY:  Denies ETOH,Smoking,   FAMILY HISTORY:        REVIEW OF SYSTEMS:    All other review of systems is negative unless indicated above.    VITALS:  T(F): 99.2 (19 @ 12:00), Max: 100.8 (19 @ 15:19)  HR: 100 (19 @ 12:00)  BP: 110/56 (19 @ 11:00)  RR: 27 (19 @ 12:00)  SpO2: 99% (19 @ 12:00)     @ 07:01  -   @ 07:00  --------------------------------------------------------  IN: 2798 mL / OUT: 865 mL / NET: 1933 mL     @ 07:01  -   @ 13:51  --------------------------------------------------------  IN: 691.8 mL / OUT: 1050 mL / NET: -358.2 mL      Height (cm): 154.94 ( @ 23:30)  Weight (kg): 42.6 ( 23:30)  BMI (kg/m2): 17.7 ( @ 23:30)  BSA (m2): 1.37 ( @ 23:30)    19 @ 07:01  -  19 @ 07:00  --------------------------------------------------------  IN: 0 mL / OUT: 865 mL / NET: -865 mL    19 @ 07:01  -  19 @ 13:51  --------------------------------------------------------  IN: 0 mL / OUT: 1050 mL / NET: -1050 mL      I&O's Detail    13 Mar 2019 07:  -  14 Mar 2019 07:00  --------------------------------------------------------  IN:    Cryoprecipitate: 123 mL    dextrose 5%: 1100 mL    IV PiggyBack: 400 mL    norepinephrine Infusion: 91 mL    pantoprazole Infusion: 50 mL    Platelets - Single Donor: 1010 mL    vasopressin Infusion: 24 mL  Total IN: 2798 mL    OUT:    Indwelling Catheter - Urethral: 865 mL  Total OUT: 865 mL    Total NET: 1933 mL      14 Mar 2019 07:01  -  14 Mar 2019 13:51  --------------------------------------------------------  IN:    dextrose 5%: 200 mL    IV PiggyBack: 50 mL    norepinephrine Infusion: 16 mL    Packed Red Blood Cells: 371 mL    pantoprazole Infusion: 50 mL    vasopressin Infusion: 4.8 mL  Total IN: 691.8 mL    OUT:    Indwelling Catheter - Urethral: 1050 mL  Total OUT: 1050 mL    Total NET: -358.2 mL        Creatine Kinase, Serum: 53 U/L (19 @ 20:44)      PHYSICAL EXAM:  Constitutional: NAD  Respiratory: CTAB, no wheezes, rales or rhonchi  Cardiovascular: S1, S2, RRR  Gastrointestinal: BS+, soft, NT/ND  Extremities: No peripheral edema  Neurological: A/O x 3  : + smart.     Vascular Access:    LABS:      141  |  94<L>  |  TNP  ----------------------------<  TNP  4.5   |  TNP  |  2.3<H>    Ca    8.8      14 Mar 2019 13:00  Mg     2.6         TPro  5.1<L>  /  Alb  3.1<L>  /  TBili  3.3<H>  /  DBili      /  AST  644<H>  /  ALT  107<H>  /  AlkPhos  589<H>      Creatinine Trend: 2.3 <--, 2.4 <--, 2.5 <--, 3.1 <--                        8.8    3.97  )-----------( 49       ( 14 Mar 2019 13:00 )             26.5     Blood Gas Arterial, Lactate: 21.0: Dr. Fox was notified in person, read back at 1338 pm mmoL/L (19 @ 13:34)    Blood Gas Profile - Arterial (19 @ 13:34)    pH, Arterial: 7.28: Dr. Fox was notified in person, read back at 1338 pm    pCO2, Arterial: 17: Dr. Fox was notified in person, read back at 1338 pm mmHg    pO2, Arterial: 75: Dr. Fox was notified in person, read back at 1338 pm mmHg    HCO3, Arterial: 8: Dr. Fox was notified in person, read back at 1338 pm mmoL/L    Base Excess, Arterial: -16.7: Dr. Fox was notified in person, read back at 1338 pm mmoL/L    Oxygen Saturation, Arterial: 94: Dr. Fox was notified in person, read back at 1338 pm %      Urine Studies:  Troponin T, Serum: <0.01 ng/mL (19 @ 15:05)    C Diff by PCR Result: Positive (19 @ 21:12)    MRSA PCR Result.: Positive: Notes  [email or web address]  By: Real-Time PCR (Polymerase Reaction Method) (19 @ 04:20)      Urinalysis Basic - ( 13 Mar 2019 19:44 )    Color: Yellow / Appearance: Cloudy / S.015 / pH:   Gluc:  / Ketone: Negative  / Bili: Negative / Urobili: 1.0 mg/dL   Blood:  / Protein: 30 mg/dL / Nitrite: Negative   Leuk Esterase: Negative / RBC: 3-5 /HPF / WBC    Sq Epi:  / Non Sq Epi: Few /HPF / Bacteria: Few /HPF                RADIOLOGY & ADDITIONAL STUDIES:  < from: Xray Chest 1 View-PORTABLE IMMEDIATE (19 @ 06:48) >  Impression:      No radiographic evidence of acute cardiopulmonary disease.    < end of copied text >    < from: US Abdomen Limited (19 @ 20:01) >  IMPRESSION:    Cholelithiasis with pericholecystic fluid and positive Lenz's sign.   Findings compatible with acute cholecystitis.    Upper abdominal ascites.    < end of copied text >    < from: CT Abdomen and Pelvis No Cont (19 @ 18:22) >  IMPRESSION:     1. Aneurysmal dilation of the abdominal aorta up to 4.3 cm with a focal   area of crescentic wall density suggestive of an intramural hematoma.    2. Gallbladder wall edema and perihepatic fluid. Findings suggestive of   underlying cholecystitis.    3. Extensive retroperitoneal and pelvic lymphadenopathy with associated   splenomegaly. Findings compatible with underlying lymphoma.    4. Air and fluid collection in the anterior thigh measuring up to 5.2 cm.    < end of copied text > NEPHROLOGY CONSULTATION NOTE    Patient does not speak english, hx taken from chart and daughter at bedside.  64 y/o F with PMHx lymphoma (diagnosed 1 week ago via neck mass, not on chemo), HTN, "heart failure" that resulted in fluid overload, presenting for nausea and vomiting. After finding out about her diagnosis, she has not been eating a drinking and eating for the past 1 wk, but has been taking lasix 40mg daily. She woke up this morning feeling, nauseous, weak, and this then resulted in the pt going to . There she was hypotensive and sent to the ER. She was hypotensive, febrile in the ED. She received 5L bolus, was started on levo and vasopressin, a R IJ central line and A line was placed bc her bp on the cuff was labile. She had CT abd and RUQ sono which showed concern for cholecystitis. Surgery evaluated pt and was not impressed with clinical picture. GI evaluated pt at bedside and stated she is low risk for chloledochelithiasis and recommended percutaneous lisbeth if the labwork worsens. Pt has no hx of gallbladder dx,  no abd sx besides . (3/13/19)    Daughter mentions that she has difficulty in swallowing saliva for many years, but her work up for swallowing is always normal. Daughter also mentioned that her weight has not changed recently and been around ~ 90 - 100 lbs for many years.    Patient is C. Diff positive on labs.  Off pressors today. s/p single dose of lasix today. bicarb drip d/linus today.    PAST MEDICAL & SURGICAL HISTORY:  Hypertension, unspecified type  Lymphoma, unspecified body region, unspecified lymphoma type  Weakness  S/P     Allergies:  No Known Allergies    Home Medications:  aspirin 81 mg oral tablet, chewable: 1 tab(s) orally once a day (13 Mar 2019 21:04)  furosemide 40 mg oral tablet: 1 tab(s) orally once a day (13 Mar 2019 21:04)  ramipril 10 mg oral capsule: 1 cap(s) orally once a day (13 Mar 2019 21:04)    Hospital Medications:   MEDICATIONS  (STANDING):  cefepime   IVPB 2000 milliGRAM(s) IV Intermittent daily  metroNIDAZOLE  IVPB 500 milliGRAM(s) IV Intermittent every 8 hours  norepinephrine Infusion 0.05 MICROgram(s)/kG/Min (3.797 mL/Hr) IV Continuous <Continuous>  pantoprazole Infusion 8 mG/Hr (10 mL/Hr) IV Continuous <Continuous>  vancomycin    Solution 250 milliGRAM(s) Oral every 6 hours      SOCIAL HISTORY:  Denies ETOH,Smoking,   FAMILY HISTORY:        REVIEW OF SYSTEMS:    All other review of systems is negative unless indicated above.    VITALS:  T(F): 99.2 (19 @ 12:00), Max: 100.8 (19 @ 15:19)  HR: 100 (19 @ 12:00)  BP: 110/56 (19 @ 11:00)  RR: 27 (19 @ 12:00)  SpO2: 99% (19 @ 12:00)     @ 07:01  -   @ 07:00  --------------------------------------------------------  IN: 2798 mL / OUT: 865 mL / NET: 1933 mL     @ 07:01  -   @ 13:51  --------------------------------------------------------  IN: 691.8 mL / OUT: 1050 mL / NET: -358.2 mL      Height (cm): 154.94 ( @ 23:30)  Weight (kg): 42.6 ( 23:30)  BMI (kg/m2): 17.7 ( @ 23:30)  BSA (m2): 1.37 ( @ 23:30)    19 @ 07:01  -  19 @ 07:00  --------------------------------------------------------  IN: 0 mL / OUT: 865 mL / NET: -865 mL    19 @ 07:01  -  19 @ 13:51  --------------------------------------------------------  IN: 0 mL / OUT: 1050 mL / NET: -1050 mL      I&O's Detail    13 Mar 2019 07:  -  14 Mar 2019 07:00  --------------------------------------------------------  IN:    Cryoprecipitate: 123 mL    dextrose 5%: 1100 mL    IV PiggyBack: 400 mL    norepinephrine Infusion: 91 mL    pantoprazole Infusion: 50 mL    Platelets - Single Donor: 1010 mL    vasopressin Infusion: 24 mL  Total IN: 2798 mL    OUT:    Indwelling Catheter - Urethral: 865 mL  Total OUT: 865 mL    Total NET: 1933 mL      14 Mar 2019 07:01  -  14 Mar 2019 13:51  --------------------------------------------------------  IN:    dextrose 5%: 200 mL    IV PiggyBack: 50 mL    norepinephrine Infusion: 16 mL    Packed Red Blood Cells: 371 mL    pantoprazole Infusion: 50 mL    vasopressin Infusion: 4.8 mL  Total IN: 691.8 mL    OUT:    Indwelling Catheter - Urethral: 1050 mL  Total OUT: 1050 mL    Total NET: -358.2 mL        Creatine Kinase, Serum: 53 U/L (19 @ 20:44)      PHYSICAL EXAM:  Constitutional: NAD  Respiratory: CTAB, no wheezes, rales or rhonchi  Cardiovascular: S1, S2, RRR  Gastrointestinal: BS+, soft, NT/ND  Extremities: No peripheral edema  Neurological: A/O x 3  : + smart.     Vascular Access:    LABS:      141  |  94<L>  |  TNP  ----------------------------<  TNP  4.5   |  TNP  |  2.3<H>    Ca    8.8      14 Mar 2019 13:00  Mg     2.6         TPro  5.1<L>  /  Alb  3.1<L>  /  TBili  3.3<H>  /  DBili      /  AST  644<H>  /  ALT  107<H>  /  AlkPhos  589<H>      Creatinine Trend: 2.3 <--, 2.4 <--, 2.5 <--, 3.1 <--                        8.8    3.97  )-----------( 49       ( 14 Mar 2019 13:00 )             26.5     Blood Gas Arterial, Lactate: 21.0: Dr. Fox was notified in person, read back at 1338 pm mmoL/L (19 @ 13:34)    Blood Gas Profile - Arterial (19 @ 13:34)    pH, Arterial: 7.28: Dr. Fox was notified in person, read back at 1338 pm    pCO2, Arterial: 17: Dr. Fox was notified in person, read back at 1338 pm mmHg    pO2, Arterial: 75: Dr. Fox was notified in person, read back at 1338 pm mmHg    HCO3, Arterial: 8: Dr. Fox was notified in person, read back at 1338 pm mmoL/L    Base Excess, Arterial: -16.7: Dr. Fox was notified in person, read back at 1338 pm mmoL/L    Oxygen Saturation, Arterial: 94: Dr. Fox was notified in person, read back at 1338 pm %      Urine Studies:  Troponin T, Serum: <0.01 ng/mL (19 @ 15:05)    C Diff by PCR Result: Positive (19 @ 21:12)    MRSA PCR Result.: Positive: Notes  [email or web address]  By: Real-Time PCR (Polymerase Reaction Method) (19 @ 04:20)      Urinalysis Basic - ( 13 Mar 2019 19:44 )    Color: Yellow / Appearance: Cloudy / S.015 / pH:   Gluc:  / Ketone: Negative  / Bili: Negative / Urobili: 1.0 mg/dL   Blood:  / Protein: 30 mg/dL / Nitrite: Negative   Leuk Esterase: Negative / RBC: 3-5 /HPF / WBC    Sq Epi:  / Non Sq Epi: Few /HPF / Bacteria: Few /HPF                RADIOLOGY & ADDITIONAL STUDIES:  < from: Xray Chest 1 View-PORTABLE IMMEDIATE (19 @ 06:48) >  Impression:      No radiographic evidence of acute cardiopulmonary disease.    < end of copied text >    < from: US Abdomen Limited (19 @ 20:01) >  IMPRESSION:    Cholelithiasis with pericholecystic fluid and positive Lenz's sign.   Findings compatible with acute cholecystitis.    Upper abdominal ascites.    < end of copied text >    < from: CT Abdomen and Pelvis No Cont (19 @ 18:22) >    KIDNEYS/URETERS: No hydronephrosis or stones. Atrophic right kidney.    IMPRESSION:     1. Aneurysmal dilation of the abdominal aorta up to 4.3 cm with a focal   area of crescentic wall density suggestive of an intramural hematoma.    2. Gallbladder wall edema and perihepatic fluid. Findings suggestive of   underlying cholecystitis.    3. Extensive retroperitoneal and pelvic lymphadenopathy with associated   splenomegaly. Findings compatible with underlying lymphoma.    4. Air and fluid collection in the anterior thigh measuring up to 5.2 cm.    < end of copied text >

## 2019-03-14 NOTE — PROGRESS NOTE ADULT - SUBJECTIVE AND OBJECTIVE BOX
62 y/o female w/ PMHx of recently diagnosed lymphoma ( not on ttt), HTN, CHF was sent in from an urgent care for further evaluation for hypotension. Patient was admitted to ICU for sepsis and GI was consulted for concern of GI source. CT imaging shows Pan Colitis and patient tested positive for C-Diff. Imaging also suggestive of Cholecystitis without obstructive pattern. Patient in guarded condition.    Vital Signs:  T(F): 100.7  HR: 124   BP: 87/49  RR: 29  SpO2: 96%  Physical Exam  Gen: NAD, was being changed  HEENT: NC/AT  Cardio: S1/S2 Tachy  Resp: CTA B/L  Abdomen: Soft, distended                          8.8    3.97  )-----------( 49       ( 14 Mar 2019 13:00 )             26.5   03-14    140  |  94<L>  |  63<HH>  ----------------------------<  36<LL>  4.5   |  7<LL>  |  2.3<H>    Ca    8.8      14 Mar 2019 14:15  Mg     1.9     03-14    TPro  4.9<L>  /  Alb  2.9<L>  /  TBili  3.3<H>  /  DBili  x   /  AST  653<H>  /  ALT  106<H>  /  AlkPhos  605<H>  03-14    CT Abdomen and Pelvis w/ Oral Cont 03.14.19  IMPRESSION:   1.  Circumferential wall thickening of the underdistended colon,   suggestive of pancolitis, likely of infectious etiology.     2.  Since March 13, 2019, slightly increased small volume abdominopelvic   ascites.    3.  New small bilateral pleural effusions.     4.  Additional findings are unchanged on the short-term follow-up   examination.

## 2019-03-15 LAB
ALBUMIN SERPL ELPH-MCNC: 2.4 G/DL — LOW (ref 3.5–5.2)
ALBUMIN SERPL ELPH-MCNC: 2.5 G/DL — LOW (ref 3.5–5.2)
ALBUMIN SERPL ELPH-MCNC: 2.6 G/DL — LOW (ref 3.5–5.2)
ALP SERPL-CCNC: 593 U/L — HIGH (ref 30–115)
ALP SERPL-CCNC: 598 U/L — HIGH (ref 30–115)
ALP SERPL-CCNC: 606 U/L — HIGH (ref 30–115)
ALT FLD-CCNC: 117 U/L — HIGH (ref 0–41)
ALT FLD-CCNC: 122 U/L — HIGH (ref 0–41)
ALT FLD-CCNC: 131 U/L — HIGH (ref 0–41)
ANION GAP SERPL CALC-SCNC: 41 MMOL/L — HIGH (ref 7–14)
ANION GAP SERPL CALC-SCNC: 43 MMOL/L — HIGH (ref 7–14)
ANION GAP SERPL CALC-SCNC: 45 MMOL/L — HIGH (ref 7–14)
APTT BLD: 57.2 SEC — HIGH (ref 27–39.2)
AST SERPL-CCNC: 1096 U/L — HIGH (ref 0–41)
AST SERPL-CCNC: 1393 U/L — HIGH (ref 0–41)
AST SERPL-CCNC: 901 U/L — HIGH (ref 0–41)
BASOPHILS # BLD AUTO: 0 K/UL — SIGNIFICANT CHANGE UP (ref 0–0.2)
BASOPHILS # BLD AUTO: 0.02 K/UL — SIGNIFICANT CHANGE UP (ref 0–0.2)
BASOPHILS # BLD AUTO: 0.03 K/UL — SIGNIFICANT CHANGE UP (ref 0–0.2)
BASOPHILS # BLD AUTO: 0.04 K/UL — SIGNIFICANT CHANGE UP (ref 0–0.2)
BASOPHILS NFR BLD AUTO: 0 % — SIGNIFICANT CHANGE UP (ref 0–1)
BASOPHILS NFR BLD AUTO: 0.5 % — SIGNIFICANT CHANGE UP (ref 0–1)
BASOPHILS NFR BLD AUTO: 0.6 % — SIGNIFICANT CHANGE UP (ref 0–1)
BASOPHILS NFR BLD AUTO: 0.8 % — SIGNIFICANT CHANGE UP (ref 0–1)
BILIRUB SERPL-MCNC: 4 MG/DL — HIGH (ref 0.2–1.2)
BILIRUB SERPL-MCNC: 4.1 MG/DL — HIGH (ref 0.2–1.2)
BILIRUB SERPL-MCNC: 4.1 MG/DL — HIGH (ref 0.2–1.2)
BUN SERPL-MCNC: 66 MG/DL — CRITICAL HIGH (ref 10–20)
BUN SERPL-MCNC: 69 MG/DL — CRITICAL HIGH (ref 10–20)
BUN SERPL-MCNC: 70 MG/DL — CRITICAL HIGH (ref 10–20)
CALCIUM SERPL-MCNC: 7 MG/DL — LOW (ref 8.5–10.1)
CALCIUM SERPL-MCNC: 7.5 MG/DL — LOW (ref 8.5–10.1)
CALCIUM SERPL-MCNC: 8 MG/DL — LOW (ref 8.5–10.1)
CHLORIDE SERPL-SCNC: 92 MMOL/L — LOW (ref 98–110)
CHLORIDE SERPL-SCNC: 95 MMOL/L — LOW (ref 98–110)
CHLORIDE SERPL-SCNC: 98 MMOL/L — SIGNIFICANT CHANGE UP (ref 98–110)
CK MB CFR SERPL CALC: 1.5 NG/ML — SIGNIFICANT CHANGE UP (ref 0.6–6.3)
CK SERPL-CCNC: 100 U/L — SIGNIFICANT CHANGE UP (ref 0–225)
CO2 SERPL-SCNC: 5 MMOL/L — CRITICAL LOW (ref 17–32)
CREAT SERPL-MCNC: 2.8 MG/DL — HIGH (ref 0.7–1.5)
CREAT SERPL-MCNC: 3.1 MG/DL — HIGH (ref 0.7–1.5)
CREAT SERPL-MCNC: 3.4 MG/DL — HIGH (ref 0.7–1.5)
D DIMER BLD IA.RAPID-MCNC: 2990 NG/ML DDU — HIGH (ref 0–230)
EOSINOPHIL # BLD AUTO: 0 K/UL — SIGNIFICANT CHANGE UP (ref 0–0.7)
EOSINOPHIL # BLD AUTO: 0.05 K/UL — SIGNIFICANT CHANGE UP (ref 0–0.7)
EOSINOPHIL NFR BLD AUTO: 0 % — SIGNIFICANT CHANGE UP (ref 0–8)
EOSINOPHIL NFR BLD AUTO: 0.9 % — SIGNIFICANT CHANGE UP (ref 0–8)
FIBRINOGEN PPP-MCNC: 72 MG/DL — CRITICAL LOW (ref 204.4–570.6)
GAS PNL BLDA: SIGNIFICANT CHANGE UP
GLUCOSE BLDC GLUCOMTR-MCNC: 111 MG/DL — HIGH (ref 70–99)
GLUCOSE BLDC GLUCOMTR-MCNC: 113 MG/DL — HIGH (ref 70–99)
GLUCOSE BLDC GLUCOMTR-MCNC: 137 MG/DL — HIGH (ref 70–99)
GLUCOSE BLDC GLUCOMTR-MCNC: 146 MG/DL — HIGH (ref 70–99)
GLUCOSE BLDC GLUCOMTR-MCNC: 166 MG/DL — HIGH (ref 70–99)
GLUCOSE BLDC GLUCOMTR-MCNC: 226 MG/DL — HIGH (ref 70–99)
GLUCOSE BLDC GLUCOMTR-MCNC: 307 MG/DL — HIGH (ref 70–99)
GLUCOSE BLDC GLUCOMTR-MCNC: 36 MG/DL — CRITICAL LOW (ref 70–99)
GLUCOSE BLDC GLUCOMTR-MCNC: 46 MG/DL — LOW (ref 70–99)
GLUCOSE BLDC GLUCOMTR-MCNC: 56 MG/DL — LOW (ref 70–99)
GLUCOSE BLDC GLUCOMTR-MCNC: 80 MG/DL — SIGNIFICANT CHANGE UP (ref 70–99)
GLUCOSE BLDC GLUCOMTR-MCNC: 90 MG/DL — SIGNIFICANT CHANGE UP (ref 70–99)
GLUCOSE BLDC GLUCOMTR-MCNC: 95 MG/DL — SIGNIFICANT CHANGE UP (ref 70–99)
GLUCOSE SERPL-MCNC: 182 MG/DL — HIGH (ref 70–99)
GLUCOSE SERPL-MCNC: 29 MG/DL — CRITICAL LOW (ref 70–99)
GLUCOSE SERPL-MCNC: 89 MG/DL — SIGNIFICANT CHANGE UP (ref 70–99)
HCT VFR BLD CALC: 21.9 % — LOW (ref 37–47)
HCT VFR BLD CALC: 26.8 % — LOW (ref 37–47)
HCT VFR BLD CALC: 26.8 % — LOW (ref 37–47)
HCT VFR BLD CALC: 34.7 % — LOW (ref 37–47)
HGB BLD-MCNC: 11 G/DL — LOW (ref 12–16)
HGB BLD-MCNC: 6.6 G/DL — CRITICAL LOW (ref 12–16)
HGB BLD-MCNC: 8.4 G/DL — LOW (ref 12–16)
HGB BLD-MCNC: 8.8 G/DL — LOW (ref 12–16)
HOROWITZ INDEX BLDA+IHG-RTO: 21 — SIGNIFICANT CHANGE UP
IMM GRANULOCYTES NFR BLD AUTO: 16.6 % — HIGH (ref 0.1–0.3)
IMM GRANULOCYTES NFR BLD AUTO: 16.7 % — HIGH (ref 0.1–0.3)
IMM GRANULOCYTES NFR BLD AUTO: 17.6 % — HIGH (ref 0.1–0.3)
INR BLD: 1.65 RATIO — HIGH (ref 0.65–1.3)
LDH SERPL L TO P-CCNC: >2500 — HIGH (ref 50–242)
LDH SERPL L TO P-CCNC: >2500 — HIGH (ref 50–242)
LYMPHOCYTES # BLD AUTO: 0.18 K/UL — LOW (ref 1.2–3.4)
LYMPHOCYTES # BLD AUTO: 0.21 K/UL — LOW (ref 1.2–3.4)
LYMPHOCYTES # BLD AUTO: 0.31 K/UL — LOW (ref 1.2–3.4)
LYMPHOCYTES # BLD AUTO: 0.33 K/UL — LOW (ref 1.2–3.4)
LYMPHOCYTES # BLD AUTO: 3.7 % — LOW (ref 20.5–51.1)
LYMPHOCYTES # BLD AUTO: 5.4 % — LOW (ref 20.5–51.1)
LYMPHOCYTES # BLD AUTO: 6.4 % — LOW (ref 20.5–51.1)
LYMPHOCYTES # BLD AUTO: 6.4 % — LOW (ref 20.5–51.1)
MAGNESIUM SERPL-MCNC: 2 MG/DL — SIGNIFICANT CHANGE UP (ref 1.8–2.4)
MAGNESIUM SERPL-MCNC: 2.2 MG/DL — SIGNIFICANT CHANGE UP (ref 1.8–2.4)
MAGNESIUM SERPL-MCNC: 2.2 MG/DL — SIGNIFICANT CHANGE UP (ref 1.8–2.4)
MCHC RBC-ENTMCNC: 27 PG — SIGNIFICANT CHANGE UP (ref 27–31)
MCHC RBC-ENTMCNC: 27.2 PG — SIGNIFICANT CHANGE UP (ref 27–31)
MCHC RBC-ENTMCNC: 27.5 PG — SIGNIFICANT CHANGE UP (ref 27–31)
MCHC RBC-ENTMCNC: 28.2 PG — SIGNIFICANT CHANGE UP (ref 27–31)
MCHC RBC-ENTMCNC: 30.1 G/DL — LOW (ref 32–37)
MCHC RBC-ENTMCNC: 31.3 G/DL — LOW (ref 32–37)
MCHC RBC-ENTMCNC: 31.7 G/DL — LOW (ref 32–37)
MCHC RBC-ENTMCNC: 32.8 G/DL — SIGNIFICANT CHANGE UP (ref 32–37)
MCV RBC AUTO: 83 FL — SIGNIFICANT CHANGE UP (ref 81–99)
MCV RBC AUTO: 86.2 FL — SIGNIFICANT CHANGE UP (ref 81–99)
MCV RBC AUTO: 89 FL — SIGNIFICANT CHANGE UP (ref 81–99)
MCV RBC AUTO: 91.3 FL — SIGNIFICANT CHANGE UP (ref 81–99)
MONOCYTES # BLD AUTO: 0.33 K/UL — SIGNIFICANT CHANGE UP (ref 0.1–0.6)
MONOCYTES # BLD AUTO: 0.77 K/UL — HIGH (ref 0.1–0.6)
MONOCYTES # BLD AUTO: 0.89 K/UL — HIGH (ref 0.1–0.6)
MONOCYTES # BLD AUTO: 1.28 K/UL — HIGH (ref 0.1–0.6)
MONOCYTES NFR BLD AUTO: 18.5 % — HIGH (ref 1.7–9.3)
MONOCYTES NFR BLD AUTO: 19.8 % — HIGH (ref 1.7–9.3)
MONOCYTES NFR BLD AUTO: 26.6 % — HIGH (ref 1.7–9.3)
MONOCYTES NFR BLD AUTO: 6.4 % — SIGNIFICANT CHANGE UP (ref 1.7–9.3)
NEUTROPHILS # BLD AUTO: 2.24 K/UL — SIGNIFICANT CHANGE UP (ref 1.4–6.5)
NEUTROPHILS # BLD AUTO: 2.34 K/UL — SIGNIFICANT CHANGE UP (ref 1.4–6.5)
NEUTROPHILS # BLD AUTO: 2.92 K/UL — SIGNIFICANT CHANGE UP (ref 1.4–6.5)
NEUTROPHILS # BLD AUTO: 3.8 K/UL — SIGNIFICANT CHANGE UP (ref 1.4–6.5)
NEUTROPHILS NFR BLD AUTO: 48.6 % — SIGNIFICANT CHANGE UP (ref 42.2–75.2)
NEUTROPHILS NFR BLD AUTO: 56.9 % — SIGNIFICANT CHANGE UP (ref 42.2–75.2)
NEUTROPHILS NFR BLD AUTO: 57.6 % — SIGNIFICANT CHANGE UP (ref 42.2–75.2)
NEUTROPHILS NFR BLD AUTO: 60.6 % — SIGNIFICANT CHANGE UP (ref 42.2–75.2)
NRBC # BLD: 1 /100 WBCS — HIGH (ref 0–0)
NRBC # BLD: 2 /100 WBCS — HIGH (ref 0–0)
NRBC # BLD: 8 /100 WBCS — HIGH (ref 0–0)
PH BLDA: 7.2 — LOW (ref 7.38–7.42)
PHOSPHATE SERPL-MCNC: 5.8 MG/DL — HIGH (ref 2.1–4.9)
PHOSPHATE SERPL-MCNC: 8.9 MG/DL — HIGH (ref 2.1–4.9)
PLATELET # BLD AUTO: 18 K/UL — CRITICAL LOW (ref 130–400)
PLATELET # BLD AUTO: 23 K/UL — LOW (ref 130–400)
PLATELET # BLD AUTO: 27 K/UL — LOW (ref 130–400)
PLATELET # BLD AUTO: 33 K/UL — LOW (ref 130–400)
PO2 BLDA: 78 MMHG — SIGNIFICANT CHANGE UP (ref 78–95)
POTASSIUM SERPL-MCNC: 4.4 MMOL/L — SIGNIFICANT CHANGE UP (ref 3.5–5)
POTASSIUM SERPL-MCNC: 4.5 MMOL/L — SIGNIFICANT CHANGE UP (ref 3.5–5)
POTASSIUM SERPL-MCNC: 4.6 MMOL/L — SIGNIFICANT CHANGE UP (ref 3.5–5)
POTASSIUM SERPL-SCNC: 4.4 MMOL/L — SIGNIFICANT CHANGE UP (ref 3.5–5)
POTASSIUM SERPL-SCNC: 4.5 MMOL/L — SIGNIFICANT CHANGE UP (ref 3.5–5)
POTASSIUM SERPL-SCNC: 4.6 MMOL/L — SIGNIFICANT CHANGE UP (ref 3.5–5)
PROT SERPL-MCNC: 4 G/DL — LOW (ref 6–8)
PROT SERPL-MCNC: 4.1 G/DL — LOW (ref 6–8)
PROT SERPL-MCNC: 4.3 G/DL — LOW (ref 6–8)
PROTHROM AB SERPL-ACNC: 18.9 SEC — HIGH (ref 9.95–12.87)
RBC # BLD: 2.4 M/UL — LOW (ref 4.2–5.4)
RBC # BLD: 3.11 M/UL — LOW (ref 4.2–5.4)
RBC # BLD: 3.23 M/UL — LOW (ref 4.2–5.4)
RBC # BLD: 3.9 M/UL — LOW (ref 4.2–5.4)
RBC # FLD: 17.2 % — HIGH (ref 11.5–14.5)
RBC # FLD: 17.4 % — HIGH (ref 11.5–14.5)
RBC # FLD: 17.8 % — HIGH (ref 11.5–14.5)
RBC # FLD: 18.5 % — HIGH (ref 11.5–14.5)
SAO2 % BLDA: 92 % — SIGNIFICANT CHANGE UP (ref 92–96)
SODIUM SERPL-SCNC: 142 MMOL/L — SIGNIFICANT CHANGE UP (ref 135–146)
SODIUM SERPL-SCNC: 143 MMOL/L — SIGNIFICANT CHANGE UP (ref 135–146)
SODIUM SERPL-SCNC: 144 MMOL/L — SIGNIFICANT CHANGE UP (ref 135–146)
TROPONIN T SERPL-MCNC: <0.01 NG/ML — SIGNIFICANT CHANGE UP
URATE SERPL-MCNC: 12.1 MG/DL — HIGH (ref 2.5–7)
URATE SERPL-MCNC: 6.7 MG/DL — SIGNIFICANT CHANGE UP (ref 2.5–7)
WBC # BLD: 3.89 K/UL — LOW (ref 4.8–10.8)
WBC # BLD: 4.82 K/UL — SIGNIFICANT CHANGE UP (ref 4.8–10.8)
WBC # BLD: 4.82 K/UL — SIGNIFICANT CHANGE UP (ref 4.8–10.8)
WBC # BLD: 5.18 K/UL — SIGNIFICANT CHANGE UP (ref 4.8–10.8)
WBC # FLD AUTO: 3.89 K/UL — LOW (ref 4.8–10.8)
WBC # FLD AUTO: 4.82 K/UL — SIGNIFICANT CHANGE UP (ref 4.8–10.8)
WBC # FLD AUTO: 4.82 K/UL — SIGNIFICANT CHANGE UP (ref 4.8–10.8)
WBC # FLD AUTO: 5.18 K/UL — SIGNIFICANT CHANGE UP (ref 4.8–10.8)

## 2019-03-15 PROCEDURE — 99222 1ST HOSP IP/OBS MODERATE 55: CPT

## 2019-03-15 PROCEDURE — 99233 SBSQ HOSP IP/OBS HIGH 50: CPT | Mod: 25

## 2019-03-15 PROCEDURE — 36800 INSERTION OF CANNULA: CPT

## 2019-03-15 RX ORDER — FIDAXOMICIN 200 MG/5ML
200 GRANULE, FOR SUSPENSION ORAL
Qty: 0 | Refills: 0 | Status: DISCONTINUED | OUTPATIENT
Start: 2019-03-15 | End: 2019-03-17

## 2019-03-15 RX ORDER — CHLORHEXIDINE GLUCONATE 213 G/1000ML
1 SOLUTION TOPICAL
Qty: 0 | Refills: 0 | Status: DISCONTINUED | OUTPATIENT
Start: 2019-03-15 | End: 2019-03-15

## 2019-03-15 RX ORDER — DEXTROSE 50 % IN WATER 50 %
100 SYRINGE (ML) INTRAVENOUS ONCE
Qty: 0 | Refills: 0 | Status: COMPLETED | OUTPATIENT
Start: 2019-03-15 | End: 2019-03-15

## 2019-03-15 RX ORDER — DEXTROSE 50 % IN WATER 50 %
50 SYRINGE (ML) INTRAVENOUS ONCE
Qty: 0 | Refills: 0 | Status: COMPLETED | OUTPATIENT
Start: 2019-03-15 | End: 2019-03-15

## 2019-03-15 RX ORDER — MIDAZOLAM HYDROCHLORIDE 1 MG/ML
0.02 INJECTION, SOLUTION INTRAMUSCULAR; INTRAVENOUS
Qty: 100 | Refills: 0 | Status: DISCONTINUED | OUTPATIENT
Start: 2019-03-15 | End: 2019-03-17

## 2019-03-15 RX ORDER — THIAMINE MONONITRATE (VIT B1) 100 MG
100 TABLET ORAL DAILY
Qty: 0 | Refills: 0 | Status: DISCONTINUED | OUTPATIENT
Start: 2019-03-15 | End: 2019-03-17

## 2019-03-15 RX ORDER — MUPIROCIN 20 MG/G
1 OINTMENT TOPICAL
Qty: 0 | Refills: 0 | Status: DISCONTINUED | OUTPATIENT
Start: 2019-03-15 | End: 2019-03-15

## 2019-03-15 RX ORDER — SODIUM BICARBONATE 1 MEQ/ML
0.35 SYRINGE (ML) INTRAVENOUS
Qty: 150 | Refills: 0 | Status: DISCONTINUED | OUTPATIENT
Start: 2019-03-15 | End: 2019-03-15

## 2019-03-15 RX ORDER — MUPIROCIN 20 MG/G
1 OINTMENT TOPICAL
Qty: 0 | Refills: 0 | Status: DISCONTINUED | OUTPATIENT
Start: 2019-03-15 | End: 2019-03-17

## 2019-03-15 RX ORDER — CHLORHEXIDINE GLUCONATE 213 G/1000ML
15 SOLUTION TOPICAL
Qty: 0 | Refills: 0 | Status: DISCONTINUED | OUTPATIENT
Start: 2019-03-15 | End: 2019-03-17

## 2019-03-15 RX ORDER — SODIUM BICARBONATE 1 MEQ/ML
50 SYRINGE (ML) INTRAVENOUS ONCE
Qty: 0 | Refills: 0 | Status: COMPLETED | OUTPATIENT
Start: 2019-03-15 | End: 2019-03-15

## 2019-03-15 RX ORDER — FIDAXOMICIN 200 MG/5ML
200 GRANULE, FOR SUSPENSION ORAL ONCE
Qty: 0 | Refills: 0 | Status: COMPLETED | OUTPATIENT
Start: 2019-03-15 | End: 2019-03-15

## 2019-03-15 RX ORDER — PANTOPRAZOLE SODIUM 20 MG/1
40 TABLET, DELAYED RELEASE ORAL EVERY 12 HOURS
Qty: 0 | Refills: 0 | Status: DISCONTINUED | OUTPATIENT
Start: 2019-03-15 | End: 2019-03-17

## 2019-03-15 RX ORDER — THIAMINE MONONITRATE (VIT B1) 100 MG
250 TABLET ORAL THREE TIMES A DAY
Qty: 0 | Refills: 0 | Status: DISCONTINUED | OUTPATIENT
Start: 2019-03-15 | End: 2019-03-15

## 2019-03-15 RX ORDER — SODIUM CHLORIDE 9 MG/ML
500 INJECTION INTRAMUSCULAR; INTRAVENOUS; SUBCUTANEOUS ONCE
Qty: 0 | Refills: 0 | Status: COMPLETED | OUTPATIENT
Start: 2019-03-15 | End: 2019-03-15

## 2019-03-15 RX ORDER — SODIUM CHLORIDE 9 MG/ML
250 INJECTION INTRAMUSCULAR; INTRAVENOUS; SUBCUTANEOUS ONCE
Qty: 0 | Refills: 0 | Status: COMPLETED | OUTPATIENT
Start: 2019-03-15 | End: 2019-03-15

## 2019-03-15 RX ORDER — NOREPINEPHRINE BITARTRATE/D5W 8 MG/250ML
0.05 PLASTIC BAG, INJECTION (ML) INTRAVENOUS
Qty: 32 | Refills: 0 | Status: DISCONTINUED | OUTPATIENT
Start: 2019-03-15 | End: 2019-03-17

## 2019-03-15 RX ORDER — SODIUM BICARBONATE 1 MEQ/ML
0.53 SYRINGE (ML) INTRAVENOUS
Qty: 150 | Refills: 0 | Status: DISCONTINUED | OUTPATIENT
Start: 2019-03-15 | End: 2019-03-16

## 2019-03-15 RX ORDER — VASOPRESSIN 20 [USP'U]/ML
0.04 INJECTION INTRAVENOUS
Qty: 100 | Refills: 0 | Status: DISCONTINUED | OUTPATIENT
Start: 2019-03-15 | End: 2019-03-17

## 2019-03-15 RX ORDER — SODIUM CHLORIDE 9 MG/ML
500 INJECTION, SOLUTION INTRAVENOUS ONCE
Qty: 0 | Refills: 0 | Status: DISCONTINUED | OUTPATIENT
Start: 2019-03-15 | End: 2019-03-15

## 2019-03-15 RX ORDER — CHLORHEXIDINE GLUCONATE 213 G/1000ML
1 SOLUTION TOPICAL
Qty: 0 | Refills: 0 | Status: DISCONTINUED | OUTPATIENT
Start: 2019-03-15 | End: 2019-03-17

## 2019-03-15 RX ORDER — PHENYLEPHRINE HYDROCHLORIDE 10 MG/ML
0.5 INJECTION INTRAVENOUS
Qty: 160 | Refills: 0 | Status: DISCONTINUED | OUTPATIENT
Start: 2019-03-15 | End: 2019-03-17

## 2019-03-15 RX ORDER — VASOPRESSIN 20 [USP'U]/ML
0.03 INJECTION INTRAVENOUS
Qty: 100 | Refills: 0 | Status: DISCONTINUED | OUTPATIENT
Start: 2019-03-15 | End: 2019-03-15

## 2019-03-15 RX ORDER — RASBURICASE 7.5 MG
8 KIT INTRAVENOUS DAILY
Qty: 0 | Refills: 0 | Status: DISCONTINUED | OUTPATIENT
Start: 2019-03-15 | End: 2019-03-17

## 2019-03-15 RX ADMIN — Medication 50 MILLILITER(S): at 16:52

## 2019-03-15 RX ADMIN — MUPIROCIN 1 APPLICATION(S): 20 OINTMENT TOPICAL at 17:30

## 2019-03-15 RX ADMIN — CEFEPIME 100 MILLIGRAM(S): 1 INJECTION, POWDER, FOR SOLUTION INTRAMUSCULAR; INTRAVENOUS at 13:43

## 2019-03-15 RX ADMIN — CHLORHEXIDINE GLUCONATE 1 APPLICATION(S): 213 SOLUTION TOPICAL at 05:53

## 2019-03-15 RX ADMIN — SODIUM CHLORIDE 3000 MILLILITER(S): 9 INJECTION INTRAMUSCULAR; INTRAVENOUS; SUBCUTANEOUS at 04:41

## 2019-03-15 RX ADMIN — Medication 250 MILLIGRAM(S): at 13:43

## 2019-03-15 RX ADMIN — RASBURICASE 110.66 MILLIGRAM(S): KIT at 05:53

## 2019-03-15 RX ADMIN — Medication 250 MILLIGRAM(S): at 17:58

## 2019-03-15 RX ADMIN — Medication 50 MILLIEQUIVALENT(S): at 01:04

## 2019-03-15 RX ADMIN — FIDAXOMICIN 200 MILLIGRAM(S): 200 GRANULE, FOR SUSPENSION ORAL at 17:38

## 2019-03-15 RX ADMIN — PANTOPRAZOLE SODIUM 40 MILLIGRAM(S): 20 TABLET, DELAYED RELEASE ORAL at 17:58

## 2019-03-15 RX ADMIN — SODIUM CHLORIDE 1000 MILLILITER(S): 9 INJECTION INTRAMUSCULAR; INTRAVENOUS; SUBCUTANEOUS at 04:41

## 2019-03-15 RX ADMIN — FIDAXOMICIN 200 MILLIGRAM(S): 200 GRANULE, FOR SUSPENSION ORAL at 14:58

## 2019-03-15 RX ADMIN — Medication 100 MILLIGRAM(S): at 06:22

## 2019-03-15 RX ADMIN — Medication 100 MILLIGRAM(S): at 13:22

## 2019-03-15 RX ADMIN — CHLORHEXIDINE GLUCONATE 15 MILLILITER(S): 213 SOLUTION TOPICAL at 05:55

## 2019-03-15 RX ADMIN — Medication 100 MILLILITER(S): at 04:41

## 2019-03-15 RX ADMIN — Medication 100 MILLIGRAM(S): at 21:16

## 2019-03-15 RX ADMIN — Medication 100 MILLILITER(S): at 20:55

## 2019-03-15 RX ADMIN — CHLORHEXIDINE GLUCONATE 15 MILLILITER(S): 213 SOLUTION TOPICAL at 17:30

## 2019-03-15 RX ADMIN — Medication 100 MILLIGRAM(S): at 05:53

## 2019-03-15 NOTE — PROGRESS NOTE ADULT - ASSESSMENT
63 yoF with pmhx of lymphoma diagnosed 1 week ago, and chf with unspecified EF presented in septic shock 2/2 likely abdominal process as of yet unspecified    GASTROINTESTINAL  # Septic shock 2/2 cholecystitis vs. pancolitis from cdif vs. autoimmune?  -  -hgb currently 8.8, platelets 49, INR 1.21, lactate 21  -exquisite RUQ tenderness and surgical abdomen / abd ultrasound shows pericholecystic fluid and positive monroy's sign consistent with acute cholecystitis / prelim read of CT a/p shows minimal colitis no perforation  -too unstable for surgery as per Dr. Fowler, IR will do percutaneous cholecystostomy    HEMATOLOGY  # Likely DIC 2/2 septic shock 2/2 cholecystitis  -hgb 8.8, received 1 unit prbc 4 units platelets and 5 cryo  -platelets 49  -f/u cbc's after IR procedure    RENAL  #ADONIS likely prerenal 2/2 septic shock  -unknown baseline, Cr on admission was 3.1, current Cr is 2.4  -patient has been getting fluids and blood products here  -pH decreasing with increasing lactate, f/u abg's after procedure  -nephro consulted    NEUROLOGICAL  -AAOx3, non focal    RESPIRATORY  -wnl, no distress    CARDIOVASCULAR  # hx of heart failure unspecified  -f/u echo, f/u LE duplex    FEEDING/ELECTROLYTES/NUTRITION  -currently NPO except medications, going for procedure  -sodium/potassium wnl    PLAN: Pt going for IR perc lisbeth right now.  F/u abg (from a line) and lactate q4h, f/u fluid cultures, monitor for signs of increasing sepsis, may need fluids/pressors/blood again, high risk procedure as per IR    # DVT PPX on scds  # GI PPX protonix infusion 63 yoF with pmhx of lymphoma diagnosed 1 week ago, and chf with unspecified EF presented in septic shock 2/2 likely abdominal process as of yet unspecified    GASTROINTESTINAL  # Septic shock 2/2 cholecystitis vs. pancolitis from cdif vs. autoimmune?  -started on fidaxomycin, getting vancomycin and flagyl IV / also getting cefepime 2g q24h  -having dark bloody bowel movements  -seen by ID today, not a clear picture of cdif due to not having edvin diarrhea / sending stool studies  -exquisite RUQ tenderness and surgical abdomen / abd ultrasound shows pericholecystic fluid and positive monroy's sign consistent with acute cholecystitis / prelim read of CT a/p shows pancolitis no perforation  -perc lisbeth done by IR 3/14/19, sludge like fluid no clear infection    HEMATOLOGY  # Likely DIC 2/2 septic shock 2/2 cholecystitis  -hgb 6.6, receiving 2 units prbc  -platelets 23, INR 1.65    RENAL  #ADONIS likely prerenal 2/2 septic shock  -unknown baseline, Cr on admission was 3.1, current Cr is 3.4 / on CVVHD  -getting sodium bicarb 200 ml/hr  -pH decreasing with increasing lactate, lactate before CVVHD 6.98, monitor after CVVHD  -nephro consulted    NEUROLOGICAL  -AAOx3, non focal    RESPIRATORY  -wnl, no distress    CARDIOVASCULAR  # hx of heart failure unspecified  -f/u echo, f/u LE duplex    FEEDING/ELECTROLYTES/NUTRITION  -currently NPO except medications, going for procedure  -sodium/potassium wnl    PLAN: Pt going for IR perc lisbeth right now.  F/u abg (from a line) and lactate q4h, f/u fluid cultures, monitor for signs of increasing sepsis, may need fluids/pressors/blood again, high risk procedure as per IR    # DVT PPX on scds  # GI PPX protonix infusion 63 yoF with pmhx of lymphoma diagnosed 1 week ago, and chf with unspecified EF presented in septic shock 2/2 likely abdominal process as of yet unspecified    GASTROINTESTINAL  # Septic shock 2/2 cholecystitis vs. pancolitis from cdif vs. autoimmune?  -started on fidaxomycin, getting vancomycin and flagyl IV / also getting cefepime 2g q24h  -having dark bloody bowel movements  -seen by ID today, not a clear picture of cdif due to not having edvin diarrhea / sending stool studies  -exquisite RUQ tenderness and surgical abdomen / abd ultrasound shows pericholecystic fluid and positive monroy's sign consistent with acute cholecystitis / prelim read of CT a/p shows pancolitis no perforation  -perc lisbeth done by IR 3/14/19, sludge like fluid no clear infection    HEMATOLOGY  # Likely DIC 2/2 septic shock 2/2 cholecystitis  -hgb 6.6, receiving 2 units prbc  -platelets 23, INR 1.65    RENAL  #ADONIS likely prerenal 2/2 septic shock  -unknown baseline, Cr on admission was 3.1, current Cr is 3.4 / on CVVHD  -getting sodium bicarb 200 ml/hr  -pH decreasing with increasing lactate, lactate before CVVHD 6.98, monitor after CVVHD, f/u 17:00  -nephro consulted    NEUROLOGICAL  -AAOx3, non focal    RESPIRATORY  -wnl, no distress    CARDIOVASCULAR  # hx of heart failure unspecified  -f/u echo, f/u LE duplex    FEEDING/ELECTROLYTES/NUTRITION  -currently NPO except medications, going for procedure  -sodium/potassium wnl    PLAN: Pt going for IR perc lisbeth right now.  F/u abg (from a line) and lactate q4h, f/u fluid cultures, monitor for signs of increasing sepsis, may need fluids/pressors/blood again, high risk procedure as per IR    # DVT PPX on scds  # GI PPX protonix infusion 63 yoF with pmhx of lymphoma diagnosed 1 week ago, and chf with unspecified EF presented in septic shock 2/2 likely abdominal process as of yet unspecified    GASTROINTESTINAL  # Septic shock 2/2 cholecystitis vs. pancolitis from cdif vs. autoimmune?  -started on fidaxomycin, getting vancomycin and flagyl IV / also getting cefepime 2g q24h  -having dark bloody bowel movements  -seen by ID today, not a clear picture of cdif due to not having edvin diarrhea / sending stool studies  -exquisite RUQ tenderness and surgical abdomen / abd ultrasound shows pericholecystic fluid and positive monroy's sign consistent with acute cholecystitis / prelim read of CT a/p shows pancolitis no perforation  -perc lisbeth done by IR 3/14/19, sludge like fluid no clear infection    HEMATOLOGY  # Likely DIC 2/2 septic shock 2/2 cholecystitis  -hgb 6.6, receiving 2 units prbc  -platelets 23, INR 1.65  -seen by hemonc, unlikely to be tumor lysis because of no chemo hx, f/u cmp, phosphorus, ldh, uric acid, if improved then no rasburicase    RENAL  #ADONIS likely prerenal 2/2 septic shock  -unknown baseline, Cr on admission was 3.1, current Cr is 3.4 / on CVVHD  -getting sodium bicarb 200 ml/hr  -pH decreasing with increasing lactate, lactate before CVVHD 6.98, monitor after CVVHD, f/u 17:00  -bicarb is less than 5, getting bicarb drip  -nephro following    NEUROLOGICAL  -intubated, withdrawing to pain    RESPIRATORY  -intubated    CARDIOVASCULAR  # EF 40% grade 1 diastolic dysfunction  -LE doppler negative    FEEDING/ELECTROLYTES/NUTRITION  -not getting TPN, will monitor after CVVHD  -sodium/potassium wnl    PLAN: Monitor for improvement in lactic acidosis and bicarb during cvvhd, f/u 17:00, 22:00, 4:00 am abg (from a line), f/u fluid cultures gallbladder, monitor for signs of increasing sepsis    # DVT PPX on scds  # GI PPX protonix infusion  FULL CODE 63 yoF with pmhx of lymphoma diagnosed 1 week ago, and chf with unspecified EF presented in septic shock 2/2 likely abdominal process as of yet unspecified    PRESSORS: norepi, epi, vaso  -intubated    GASTROINTESTINAL  # Septic shock 2/2 cholecystitis vs. pancolitis from cdif vs. autoimmune?  -started on fidaxomycin, getting vancomycin and flagyl IV / also getting cefepime 2g q24h  -having dark bloody bowel movements  -seen by ID today, not a clear picture of cdif due to not having edvin diarrhea / sending stool studies  -exquisite RUQ tenderness and surgical abdomen / abd ultrasound shows pericholecystic fluid and positive monroy's sign consistent with acute cholecystitis / prelim read of CT a/p shows pancolitis no perforation  -perc lisbeth done by IR 3/14/19, sludge like fluid no clear infection    HEMATOLOGY  # Likely DIC 2/2 septic shock 2/2 cholecystitis  -hgb 6.6, receiving 2 units prbc  -platelets 23, INR 1.65  -seen by hemonc, unlikely to be tumor lysis because of no chemo hx, f/u cmp, phosphorus, ldh, uric acid, if improved then no rasburicase    RENAL  #ADONIS likely prerenal 2/2 septic shock  -unknown baseline, Cr on admission was 3.1, current Cr is 3.4 / on CVVHD  -getting sodium bicarb 200 ml/hr  -pH decreasing with increasing lactate, lactate before CVVHD 6.98, monitor after CVVHD, f/u 17:00  -bicarb is less than 5, getting bicarb drip  -nephro following    NEUROLOGICAL  -intubated, withdrawing to pain    RESPIRATORY  -intubated    CARDIOVASCULAR  # EF 40% grade 1 diastolic dysfunction  -LE doppler negative    FEEDING/ELECTROLYTES/NUTRITION  -not getting TPN, will monitor after CVVHD  -sodium/potassium wnl    PLAN: Monitor for improvement in lactic acidosis and bicarb during cvvhd, f/u 17:00, 22:00, 4:00 am abg (from a line), f/u fluid cultures gallbladder, monitor for signs of increasing sepsis    # DVT PPX on scds  # GI PPX protonix infusion  FULL CODE

## 2019-03-15 NOTE — PROGRESS NOTE ADULT - ASSESSMENT
IMPRESSION:    Sepsis septic shock  Cholecystitis.  CDiff   Recent DX of Lymphoma  Lactic acidosis worsening       PLAN:    CNS: No depressants.  FU MS>      HEENT: Oral care    PULMONARY:  HOB @ 45 degrees.  No vent changes X FiO2 30 %    CARDIOVASCULAR: Wean Bradley CE.  ECHO.  ABG Q4.  NaHCo3.      GI: GI prophylaxis.  NPO.  FU with surgery.  Protonix BID     RENAL:  Follow up lytes.  Correct as needed.  Agree with CVVHD.  I=O    INFECTIOUS DISEASE: Follow up cultures.  Cefipime and Flogyl.  Vanc 250 Q6    HEMATOLOGICAL:  DVT prophylaxis.  FU CBC and coags     ENDOCRINE:  Follow up FS.  Insulin protocol if needed.    MUSCULOSKELETAL:  OOB to chair     Keep Urrutia Bladder pressure    Prognosis poor

## 2019-03-15 NOTE — PROGRESS NOTE ADULT - SUBJECTIVE AND OBJECTIVE BOX
GENERAL SURGERY PROGRESS NOTE     SALONI COOPER  63yFemale  Surgical Attending: Gave  Overnight events: Yesterday patient went to IR for per lisbeth, 8.5F drain was placed with 12cc bilious fluid sent for culture. Was intubated for airway protection. This AM, on vaso 0.04, levo 0.1, maxed out on michelle    T(F): 100.7 (03-15-19 @ 00:00), Max: 101 (19 @ 20:00)  HR: 140 (03-15-19 @ 05:15) (90 - 152)  BP: 97/52 (19 @ 20:00) (68/38 - 117/59)  ABP: 96/46 (03-15-19 @ 05:15) (78/42 - 144/52)  ABP(mean): 62 (03-15-19 @ 05:15) (20 - 86)  RR: 35 (03-15-19 @ 05:15) (18 - 37)  SpO2: 99% (03-15-19 @ 05:15) (94% - 100%)      19 @ 07:01  -  03-15-19 @ 07:00  --------------------------------------------------------  IN:    dextrose 5%: 200 mL    dextrose 5% + sodium chloride 0.45%.: 300 mL    IV PiggyBack: 150 mL    norepinephrine Infusion: 466 mL    Packed Red Blood Cells: 371 mL    pantoprazole Infusion: 200 mL    sodium bicarbonate  Infusion: 300 mL    sodium bicarbonate  Infusion: 300 mL    Sodium Chloride 0.9% IV Bolus: 750 mL    vasopressin Infusion: 4.8 mL  Total IN: 3041.8 mL    OUT:    Drain: 20 mL    Indwelling Catheter - Urethral: 1740 mL  Total OUT: 1760 mL    Total NET: 1281.8 mL        DIET/FLUIDS: sodium bicarbonate  Infusion 0.528 mEq/kG/Hr IV Continuous <Continuous>  thiamine Injectable 100 milliGRAM(s) IV Push daily    NG:                                                                                DRAINS:   19 @ 07:01  -  03-15-19 @ 07:00  --------------------------------------------------------  OUT: 20 mL      BM:     EMESIS:     URINE:   19 @ 07:01  -  03-15-19 @ 07:00  --------------------------------------------------------  OUT: 1740 mL     Indwelling Urethral Catheter:     Connect To:  Straight Drainage/[place]    Indication:  Urinary Retention / Obstruction (19 @ 09:47)  Indwelling Urethral Catheter:     Connect To:  Leg Bag    Indication:  Urine Output Monitoring in Critically Ill (19 @ 12:53)    GI proph:  pantoprazole Infusion 8 mG/Hr IV Continuous <Continuous>    AC/ proph:   ABx: cefepime   IVPB 2000 milliGRAM(s) IV Intermittent daily  metroNIDAZOLE  IVPB 500 milliGRAM(s) IV Intermittent every 8 hours  vancomycin    Solution 250 milliGRAM(s) Oral every 6 hours      PHYSICAL EXAM:  GENERAL: NAD, well-appearing  CHEST/LUNG: Clear to auscultation bilaterally  HEART: Regular rate and rhythm  ABDOMEN: Soft, Nontender, Nondistended;   EXTREMITIES:  No clubbing, cyanosis, or edema      LABS  Labs:  CAPILLARY BLOOD GLUCOSE      POCT Blood Glucose.: 166 mg/dL (15 Mar 2019 05:20)  POCT Blood Glucose.: 36 mg/dL (15 Mar 2019 03:30)  POCT Blood Glucose.: 239 mg/dL (14 Mar 2019 16:46)  POCT Blood Glucose.: 27 mg/dL (14 Mar 2019 16:30)                          8.4    4.82  )-----------( 33       ( 15 Mar 2019 05:20 )             26.8       Auto Neutrophil %: 48.6 % (03-15-19 @ 05:20)  Auto Immature Granulocyte %: 17.6 % (03-15-19 @ 05:20)  Auto Neutrophil %: 57.6 % (03-15-19 @ 01:40)  Auto Immature Granulocyte %: 16.7 % (03-15-19 @ 01:40)  Auto Immature Granulocyte %: 11.8 % (19 @ 13:00)  Auto Neutrophil %: 64.4 % (19 @ 13:00)    03-15    144  |  98  |  69<HH>  ----------------------------<  182<H>  4.6   |  5<LL>  |  3.1<H>      eGFR if Non African American: 15 mL/min/1.73M2 (03-15-19 @ 05:20)      LFTs:             4.1  | 4.0  | 1096     ------------------[598     ( 15 Mar 2019 05:20 )  2.5  | x    | 122         Lipase:x      Amylase:x         Blood Gas Arterial, Lactate: 23.0 mmoL/L (03-15-19 @ 04:18)  Blood Gas Arterial, Lactate: 23.0 mmoL/L (03-15-19 @ 00:23)  Blood Gas Arterial, Lactate: 22.0 mmoL/L (19 @ 20:24)  Lactate, Blood: 22.7 mmol/L (19 @ 14:15)  Blood Gas Arterial, Lactate: 21.0 mmoL/L (19 @ 13:34)  Blood Gas Arterial, Lactate: 19.0 mmoL/L (19 @ 09:00)  Lactate, Blood: 13.3 mmol/L (19 @ 04:20)  Lactate, Blood: 11.7 mmol/L (19 @ 19:34)  Blood Gas Venous - Lactate: 9.4 mmoL/L (19 @ 17:15)  Blood Gas Venous - Lactate: 9.5 mmoL/L (19 @ 16:40)    ABG - ( 15 Mar 2019 06:02 )  pH: 7.04  /  pCO2: 22    /  pO2: 144   / HCO3: 6     / Base Excess: -23.0 /  SaO2: 97              ABG - ( 15 Mar 2019 04:18 )  pH: 7.07  /  pCO2: 25    /  pO2: 148   / HCO3: 7     / Base Excess: -21.3 /  SaO2: 98              ABG - ( 15 Mar 2019 00:23 )  pH: 7.20  /  pCO2: x     /  pO2: 78    / HCO3: x     / Base Excess: x     /  SaO2: 92                Coags:     18.90  ----< 1.65    ( 15 Mar 2019 01:40 )     57.2        CARDIAC MARKERS ( 15 Mar 2019 01:40 )  x     / <0.01 ng/mL / 100 U/L / x     / 1.5 ng/mL  CARDIAC MARKERS ( 13 Mar 2019 20:44 )  x     / x     / 53 U/L / x     / x      CARDIAC MARKERS ( 13 Mar 2019 15:05 )  x     / <0.01 ng/mL / x     / x     / x              Urinalysis Basic - ( 13 Mar 2019 19:44 )    Color: Yellow / Appearance: Cloudy / S.015 / pH: x  Gluc: x / Ketone: Negative  / Bili: Negative / Urobili: 1.0 mg/dL   Blood: x / Protein: 30 mg/dL / Nitrite: Negative   Leuk Esterase: Negative / RBC: 3-5 /HPF / WBC x   Sq Epi: x / Non Sq Epi: Few /HPF / Bacteria: Few /HPF        Culture - Body Fluid with Gram Stain (collected 14 Mar 2019 19:18)  Source: .Body Fluid None  Gram Stain (15 Mar 2019 06:44):    No polymorphonuclear cells seen    No organisms seen    by cytocentrifuge    Culture - Blood (collected 13 Mar 2019 15:10)  Source: .Blood Blood  Preliminary Report (14 Mar 2019 23:00):    No growth to date.          RADIOLOGY & ADDITIONAL TESTS:      A/P:  SALONI COOPER is a 63yFemale HD/POD___ from . She is currently    Plan: GENERAL SURGERY PROGRESS NOTE     SALONI COOPER  63yFemale  Surgical Attending: Gave  Overnight events: Yesterday patient went to IR for per lisbeth, 8.5F drain was placed with 12cc bilious fluid sent for culture. Was intubated for airway protection. This AM, on vaso 0.04, levo 0.1, maxed out on michelle. Remains on PTX gtt. LFTs remain wildly deranged, creatinine rising, acidotic on vent. Goals of care meeting held with family this AM.     T(F): 100.7 (03-15-19 @ 00:00), Max: 101 (19 @ 20:00)  HR: 140 (03-15-19 @ 05:15) (90 - 152)  BP: 97/52 (19 @ 20:00) (68/38 - 117/59)  ABP: 96/46 (03-15-19 @ 05:15) (78/42 - 144/52)  ABP(mean): 62 (03-15-19 @ 05:15) (20 - 86)  RR: 35 (03-15-19 @ 05:15) (18 - 37)  SpO2: 99% (03-15-19 @ 05:15) (94% - 100%)      19 @ 07:01  -  03-15-19 @ 07:00  --------------------------------------------------------  IN:    dextrose 5%: 200 mL    dextrose 5% + sodium chloride 0.45%.: 300 mL    IV PiggyBack: 150 mL    norepinephrine Infusion: 466 mL    Packed Red Blood Cells: 371 mL    pantoprazole Infusion: 200 mL    sodium bicarbonate  Infusion: 300 mL    sodium bicarbonate  Infusion: 300 mL    Sodium Chloride 0.9% IV Bolus: 750 mL    vasopressin Infusion: 4.8 mL  Total IN: 3041.8 mL    OUT:    Drain: 20 mL    Indwelling Catheter - Urethral: 1740 mL  Total OUT: 1760 mL    Total NET: 1281.8 mL        DIET/FLUIDS: sodium bicarbonate  Infusion 0.528 mEq/kG/Hr IV Continuous <Continuous>  thiamine Injectable 100 milliGRAM(s) IV Push daily    NG:                                                                                DRAINS:   19 @ 07:01  -  03-15-19 @ 07:00  --------------------------------------------------------  OUT: 20 mL      BM:     EMESIS:     URINE:   19 @ 07:01  -  03-15-19 @ 07:00  --------------------------------------------------------  OUT: 1740 mL     Indwelling Urethral Catheter:     Connect To:  Straight Drainage/[place]    Indication:  Urinary Retention / Obstruction (19 @ 09:47)  Indwelling Urethral Catheter:     Connect To:  Leg Bag    Indication:  Urine Output Monitoring in Critically Ill (19 @ 12:53)    GI proph:  pantoprazole Infusion 8 mG/Hr IV Continuous <Continuous>    AC/ proph:   ABx: cefepime   IVPB 2000 milliGRAM(s) IV Intermittent daily  metroNIDAZOLE  IVPB 500 milliGRAM(s) IV Intermittent every 8 hours  vancomycin    Solution 250 milliGRAM(s) Oral every 6 hours      PHYSICAL EXAM:  GENERAL: intubated, on pressors  CHEST/LUNG: Coarse to auscultation bilaterally  HEART: Tachycardic rate and rhythm  ABDOMEN: Soft, distended;         LABS  Labs:  CAPILLARY BLOOD GLUCOSE      POCT Blood Glucose.: 166 mg/dL (15 Mar 2019 05:20)  POCT Blood Glucose.: 36 mg/dL (15 Mar 2019 03:30)  POCT Blood Glucose.: 239 mg/dL (14 Mar 2019 16:46)  POCT Blood Glucose.: 27 mg/dL (14 Mar 2019 16:30)                          8.4    4.82  )-----------( 33       ( 15 Mar 2019 05:20 )             26.8       Auto Neutrophil %: 48.6 % (03-15-19 @ 05:20)  Auto Immature Granulocyte %: 17.6 % (03-15-19 @ 05:20)  Auto Neutrophil %: 57.6 % (03-15-19 @ 01:40)  Auto Immature Granulocyte %: 16.7 % (03-15-19 @ 01:40)  Auto Immature Granulocyte %: 11.8 % (19 @ 13:00)  Auto Neutrophil %: 64.4 % (19 @ 13:00)    03-15    144  |  98  |  69<HH>  ----------------------------<  182<H>  4.6   |  5<LL>  |  3.1<H>      eGFR if Non African American: 15 mL/min/1.73M2 (03-15-19 @ 05:20)      LFTs:             4.1  | 4.0  | 1096     ------------------[598     ( 15 Mar 2019 05:20 )  2.5  | x    | 122         Lipase:x      Amylase:x         Blood Gas Arterial, Lactate: 23.0 mmoL/L (03-15-19 @ 04:18)  Blood Gas Arterial, Lactate: 23.0 mmoL/L (03-15-19 @ 00:23)  Blood Gas Arterial, Lactate: 22.0 mmoL/L (19 @ 20:24)  Lactate, Blood: 22.7 mmol/L (19 @ 14:15)  Blood Gas Arterial, Lactate: 21.0 mmoL/L (19 @ 13:34)  Blood Gas Arterial, Lactate: 19.0 mmoL/L (19 @ 09:00)  Lactate, Blood: 13.3 mmol/L (19 @ 04:20)  Lactate, Blood: 11.7 mmol/L (19 @ 19:34)  Blood Gas Venous - Lactate: 9.4 mmoL/L (19 @ 17:15)  Blood Gas Venous - Lactate: 9.5 mmoL/L (19 @ 16:40)    ABG - ( 15 Mar 2019 06:02 )  pH: 7.04  /  pCO2: 22    /  pO2: 144   / HCO3: 6     / Base Excess: -23.0 /  SaO2: 97              ABG - ( 15 Mar 2019 04:18 )  pH: 7.07  /  pCO2: 25    /  pO2: 148   / HCO3: 7     / Base Excess: -21.3 /  SaO2: 98              ABG - ( 15 Mar 2019 00:23 )  pH: 7.20  /  pCO2: x     /  pO2: 78    / HCO3: x     / Base Excess: x     /  SaO2: 92                Coags:     18.90  ----< 1.65    ( 15 Mar 2019 01:40 )     57.2        CARDIAC MARKERS ( 15 Mar 2019 01:40 )  x     / <0.01 ng/mL / 100 U/L / x     / 1.5 ng/mL  CARDIAC MARKERS ( 13 Mar 2019 20:44 )  x     / x     / 53 U/L / x     / x      CARDIAC MARKERS ( 13 Mar 2019 15:05 )  x     / <0.01 ng/mL / x     / x     / x              Urinalysis Basic - ( 13 Mar 2019 19:44 )    Color: Yellow / Appearance: Cloudy / S.015 / pH: x  Gluc: x / Ketone: Negative  / Bili: Negative / Urobili: 1.0 mg/dL   Blood: x / Protein: 30 mg/dL / Nitrite: Negative   Leuk Esterase: Negative / RBC: 3-5 /HPF / WBC x   Sq Epi: x / Non Sq Epi: Few /HPF / Bacteria: Few /HPF        Culture - Body Fluid with Gram Stain (collected 14 Mar 2019 19:18)  Source: .Body Fluid None  Gram Stain (15 Mar 2019 06:44):    No polymorphonuclear cells seen    No organisms seen    by cytocentrifuge    Culture - Blood (collected 13 Mar 2019 15:10)  Source: .Blood Blood  Preliminary Report (14 Mar 2019 23:00):    No growth to date.

## 2019-03-15 NOTE — CONSULT NOTE ADULT - CONSULT REASON
ct findings aneurysmal aorta
ADONIS
Cholecystitis vs Choledocholithiasis
HYPOTENSION
New diagnosis of lymphoma, suspicion of tumor lysis syndrome
for cholecystitis, rule out choledocholithiasis
percutaneous cholecystostomy tube placement
CDI

## 2019-03-15 NOTE — CONSULT NOTE ADULT - SUBJECTIVE AND OBJECTIVE BOX
CHIKIERIKASALONI KAN  63y, Female  Allergy: No Known Allergies      HPI:  64 y/o F with PMHx lymphoma (diagnosed 1 week ago via neck mass, not on chemo), HTN, "heart failure" that resulted in fluid overload, presenting for nausea and vomiting. After finding out about her diagnosis, she has not been eating a drinking and eating for the past 1 wk, but has been taking lasix 40mg daily. She woke up this morning feeling, nauseous, weak, and this then resulted in the pt going to . There she was hypotensive and sent to the ER. She was hypotensive, febrile in the ED. She received 5L bolus, was started on levo and vasopressin, a R IJ central line and A line was placed bc her bp on the cuff was labile. She had CT abd and RUQ sono which showed concern for cholecystitis. Surgery evaluated pt and was not impressed with clinical picture. GI evaluated pt at bedside and stated she is low risk for chloledochelithiasis and recommended percutaneous lisbeth if the labwork worsens. Pt has no hx of gallbladder dx,  no abd sx besides . (13 Mar 2019 20:45)    pt s/p percutaneous lisbeth, no PMN, cx NGTD  Found to be CDiff +, CT with pancolitis  Afebrile, no WBC  Bcx remani NGTD    FAMILY HISTORY:    PAST MEDICAL & SURGICAL HISTORY:  Hypertension, unspecified type  Lymphoma, unspecified body region, unspecified lymphoma type  Weakness  S/P       ROS negative except as per HPI    VITALS:  T(F): 103.4, Max: 103.4 (03-15-19 @ 08:00)  HR: 142  BP: 80/49  RR: 32Vital Signs Last 24 Hrs  T(C): 39.7 (15 Mar 2019 08:00), Max: 39.7 (15 Mar 2019 08:00)  T(F): 103.4 (15 Mar 2019 08:00), Max: 103.4 (15 Mar 2019 08:00)  HR: 142 (15 Mar 2019 11:00) (108 - 152)  BP: 80/49 (15 Mar 2019 08:00) (68/38 - 113/53)  BP(mean): 59 (15 Mar 2019 08:00) (48 - 73)  RR: 32 (15 Mar 2019 11:00) (18 - 37)  SpO2: 100% (15 Mar 2019 11:00) (94% - 100%)    PHYSICAL EXAM:  Gen: intubated  HEENT: NCAT.  Neck: Supple  CV: tachy  Lungs: decreased bases  Abd: Soft but distended, no rigidity, RYANN ss fluid  Extr: wwp, no edema  Skin: no rash  Neuro: sedated  Lines: clean, R neck TLC, L groin      TESTS & MEASUREMENTS:                        8.4    4.82  )-----------( 33       ( 15 Mar 2019 05:20 )             26.8     -15    144  |  98  |  69<HH>  ----------------------------<  182<H>  4.6   |  5<LL>  |  3.1<H>    Ca    7.5<L>      15 Mar 2019 05:20  Phos  5.8     03-15  Mg     2.2     03-15    TPro  4.1<L>  /  Alb  2.5<L>  /  TBili  4.0<H>  /  DBili  x   /  AST  1096<H>  /  ALT  122<H>  /  AlkPhos  598<H>  03-15    LIVER FUNCTIONS - ( 15 Mar 2019 05:20 )  Alb: 2.5 g/dL / Pro: 4.1 g/dL / ALK PHOS: 598 U/L / ALT: 122 U/L / AST: 1096 U/L / GGT: x             Culture - Body Fluid with Gram Stain (collected 19 @ 19:18)  Source: .Body Fluid None  Gram Stain (03-15-19 @ 06:44):    No polymorphonuclear cells seen    No organisms seen    by cytocentrifuge    Culture - Blood (collected 19 @ 15:10)  Source: .Blood Blood  Preliminary Report (19 @ 23:00):    No growth to date.      Urinalysis Basic - ( 13 Mar 2019 19:44 )    Color: Yellow / Appearance: Cloudy / S.015 / pH: x  Gluc: x / Ketone: Negative  / Bili: Negative / Urobili: 1.0 mg/dL   Blood: x / Protein: 30 mg/dL / Nitrite: Negative   Leuk Esterase: Negative / RBC: 3-5 /HPF / WBC x   Sq Epi: x / Non Sq Epi: Few /HPF / Bacteria: Few /HPF          RADIOLOGY & ADDITIONAL TESTS:    ANTIBIOTICS:  cefepime   IVPB   100 mL/Hr IV Intermittent (19 @ 11:49)    meropenem  IVPB   100 mL/Hr IV Intermittent (19 @ 02:00)    metroNIDAZOLE  IVPB   100 mL/Hr IV Intermittent (19 @ 05:53)   100 mL/Hr IV Intermittent (19 @ 01:00)    metroNIDAZOLE  IVPB   100 mL/Hr IV Intermittent (03-15-19 @ 05:53)   100 mL/Hr IV Intermittent (19 @ 21:51)   100 mL/Hr IV Intermittent (19 @ 15:21)    oseltamivir   75 milliGRAM(s) Oral (19 @ 18:50)    piperacillin/tazobactam IVPB.   200 mL/Hr IV Intermittent (19 @ 21:00)    vancomycin    Solution   250 milliGRAM(s) Oral (19 @ 18:32)   250 milliGRAM(s) Oral (19 @ 09:54)    vancomycin  IVPB   250 mL/Hr IV Intermittent (19 @ 06:09)    vancomycin  IVPB   250 mL/Hr IV Intermittent (19 @ 22:00)

## 2019-03-15 NOTE — PROGRESS NOTE ADULT - ASSESSMENT
A/P:  SALONI COOPER is a 63yFemale HD3 in septic shock.    Plan:  -continue oral vanc  -continue on vent, given acidosis  -continue on pressors  -continue fluids, low UOP  -no surgical intervention  -poor prognosis

## 2019-03-15 NOTE — PROGRESS NOTE ADULT - SUBJECTIVE AND OBJECTIVE BOX
SUBJECTIVE:    Patient is a 63y old Female who presents with a chief complaint of septic shock (15 Mar 2019 12:14)      HPI:  64 y/o F with PMHx lymphoma (diagnosed 1 week ago via neck mass, not on chemo), HTN, "heart failure" that resulted in fluid overload, presenting for nausea and vomiting. After finding out about her diagnosis, she has not been eating a drinking and eating for the past 1 wk, but has been taking lasix 40mg daily. She woke up this morning feeling, nauseous, weak, and this then resulted in the pt going to . There she was hypotensive and sent to the ER. She was hypotensive, febrile in the ED. She received 5L bolus, was started on levo and vasopressin, a R IJ central line and A line was placed bc her bp on the cuff was labile. She had CT abd and RUQ sono which showed concern for cholecystitis. Surgery evaluated pt and was not impressed with clinical picture. GI evaluated pt at bedside and stated she is low risk for chloledochelithiasis and recommended percutaneous lisbeth if the labwork worsens. Pt has no hx of gallbladder dx,  no abd sx besides . (13 Mar 2019 20:45)      Currently admitted to medicine with the primary diagnosis of Severe sepsis     Patient was intubated overnight and was started on rasburicase for elevated uric acid.    Besides the pertinent positives and negatives described above, the ROS was within normal limits.    PAST MEDICAL & SURGICAL HISTORY  Hypertension, unspecified type  Lymphoma, unspecified body region, unspecified lymphoma type  Weakness  S/P     SOCIAL HISTORY:    ALLERGIES:  No Known Allergies    MEDICATIONS:  STANDING MEDICATIONS  cefepime   IVPB 2000 milliGRAM(s) IV Intermittent daily  chlorhexidine 0.12% Liquid 15 milliLiter(s) Oral Mucosa two times a day  chlorhexidine 4% Liquid 1 Application(s) Topical <User Schedule>  CRRT Treatment    <Continuous>  fidaxomicin 200 milliGRAM(s) Oral two times a day  fidaxomicin 200 milliGRAM(s) Oral once  metroNIDAZOLE  IVPB 500 milliGRAM(s) IV Intermittent every 8 hours  midazolam Infusion 0.02 mG/kG/Hr IV Continuous <Continuous>  mupirocin 2% Ointment 1 Application(s) Topical two times a day  norepinephrine Infusion 0.05 MICROgram(s)/kG/Min IV Continuous <Continuous>  pantoprazole  Injectable 40 milliGRAM(s) IV Push every 12 hours  phenylephrine    Infusion 0.5 MICROgram(s)/kG/Min IV Continuous <Continuous>  PureFlow Dialysate RFP-400 (K 2 / Ca 3) 5000 milliLiter(s) CRRT <Continuous>  rasburicase IVPB 8 milliGRAM(s) IV Intermittent daily  sodium bicarbonate  Infusion 0.528 mEq/kG/Hr IV Continuous <Continuous>  thiamine Injectable 100 milliGRAM(s) IV Push daily  vancomycin    Solution 250 milliGRAM(s) Oral every 6 hours  vasopressin Infusion 0.04 Unit(s)/Min IV Continuous <Continuous>    PRN MEDICATIONS    VITALS:   T(F): 103.8  HR: 140  BP: 80/49  RR: 33  SpO2: 98%    LABS:                        6.6    4.82  )-----------( 23       ( 15 Mar 2019 13:40 )             21.9     03-15    144  |  98  |  69<HH>  ----------------------------<  182<H>  4.6   |  5<LL>  |  3.1<H>    Ca    7.5<L>      15 Mar 2019 05:20  Phos  5.8     03-15  Mg     2.2     03-15    TPro  4.1<L>  /  Alb  2.5<L>  /  TBili  4.0<H>  /  DBili  x   /  AST  1096<H>  /  ALT  122<H>  /  AlkPhos  598<H>  03-15    PT/INR - ( 15 Mar 2019 01:40 )   PT: 18.90 sec;   INR: 1.65 ratio         PTT - ( 15 Mar 2019 01:40 )  PTT:57.2 sec  Urinalysis Basic - ( 13 Mar 2019 19:44 )    Color: Yellow / Appearance: Cloudy / S.015 / pH: x  Gluc: x / Ketone: Negative  / Bili: Negative / Urobili: 1.0 mg/dL   Blood: x / Protein: 30 mg/dL / Nitrite: Negative   Leuk Esterase: Negative / RBC: 3-5 /HPF / WBC x   Sq Epi: x / Non Sq Epi: Few /HPF / Bacteria: Few /HPF      ABG - ( 15 Mar 2019 14:01 )  pH, Arterial: 6.98  pH, Blood: x     /  pCO2: 20    /  pO2: 250   / HCO3: 5     / Base Excess: -24.8 /  SaO2: 98                Creatine Kinase, Serum: 100 U/L (03-15-19 @ 01:40)  Troponin T, Serum: <0.01 ng/mL (03-15-19 @ 01:40)      Culture - Body Fluid with Gram Stain (collected 14 Mar 2019 19:18)  Source: .Body Fluid None  Gram Stain (15 Mar 2019 06:44):    No polymorphonuclear cells seen    No organisms seen    by cytocentrifuge    Culture - Blood (collected 14 Mar 2019 04:11)  Source: .Blood None  Preliminary Report (15 Mar 2019 14:00):    No growth to date.    Culture - Blood (collected 13 Mar 2019 15:10)  Source: .Blood Blood  Preliminary Report (15 Mar 2019 14:00):    No growth to date.    Culture - Blood (collected 13 Mar 2019 15:10)  Source: .Blood Blood  Preliminary Report (14 Mar 2019 23:00):    No growth to date.      CARDIAC MARKERS ( 15 Mar 2019 01:40 )  x     / <0.01 ng/mL / 100 U/L / x     / 1.5 ng/mL  CARDIAC MARKERS ( 13 Mar 2019 20:44 )  x     / x     / 53 U/L / x     / x      CARDIAC MARKERS ( 13 Mar 2019 15:05 )  x     / <0.01 ng/mL / x     / x     / x          RADIOLOGY:    PHYSICAL EXAM:  GEN: intubated  LUNGS: Clear to auscultation bilaterally   HEART: Regular  ABD: was distended earlier, had OG tube on low intermittent suction  EXT: noncyanotic/NE/2+PP/VANCE/Skin Intact.   NEURO: intubated    Intravenous access: yes, IJ line, A line, central  NG tube: og tube  Urrutia Catheter:   Indwelling Urethral Catheter:     Connect To:  Straight Drainage/[place]    Indication:  Urinary Retention / Obstruction (19 @ 09:47) (not performed) [active]  Indwelling Urethral Catheter:     Connect To:  Leg Bag    Indication:  Urine Output Monitoring in Critically Ill (19 @ 12:53) (not performed) [active] SUBJECTIVE:    Patient is a 63y old Female who presents with a chief complaint of septic shock (15 Mar 2019 12:14)      HPI:  64 y/o F with PMHx lymphoma (diagnosed 1 week ago via neck mass, not on chemo), HTN, "heart failure" that resulted in fluid overload, presenting for nausea and vomiting. After finding out about her diagnosis, she has not been eating a drinking and eating for the past 1 wk, but has been taking lasix 40mg daily. She woke up this morning feeling, nauseous, weak, and this then resulted in the pt going to . There she was hypotensive and sent to the ER. She was hypotensive, febrile in the ED. She received 5L bolus, was started on levo and vasopressin, a R IJ central line and A line was placed bc her bp on the cuff was labile. She had CT abd and RUQ sono which showed concern for cholecystitis. Surgery evaluated pt and was not impressed with clinical picture. GI evaluated pt at bedside and stated she is low risk for chloledochelithiasis and recommended percutaneous lisbeth if the labwork worsens. Pt has no hx of gallbladder dx,  no abd sx besides . (13 Mar 2019 20:45)      Currently admitted to medicine with the primary diagnosis of Severe sepsis     Patient was intubated overnight and was started on rasburicase for elevated uric acid.    Besides the pertinent positives and negatives described above, the ROS was within normal limits.    PAST MEDICAL & SURGICAL HISTORY  Hypertension, unspecified type  Lymphoma, unspecified body region, unspecified lymphoma type  Weakness  S/P     SOCIAL HISTORY:    ALLERGIES:  No Known Allergies    MEDICATIONS:  STANDING MEDICATIONS  cefepime   IVPB 2000 milliGRAM(s) IV Intermittent daily  chlorhexidine 0.12% Liquid 15 milliLiter(s) Oral Mucosa two times a day  chlorhexidine 4% Liquid 1 Application(s) Topical <User Schedule>  CRRT Treatment    <Continuous>  fidaxomicin 200 milliGRAM(s) Oral two times a day  fidaxomicin 200 milliGRAM(s) Oral once  metroNIDAZOLE  IVPB 500 milliGRAM(s) IV Intermittent every 8 hours  midazolam Infusion 0.02 mG/kG/Hr IV Continuous <Continuous>  mupirocin 2% Ointment 1 Application(s) Topical two times a day  norepinephrine Infusion 0.05 MICROgram(s)/kG/Min IV Continuous <Continuous>  pantoprazole  Injectable 40 milliGRAM(s) IV Push every 12 hours  phenylephrine    Infusion 0.5 MICROgram(s)/kG/Min IV Continuous <Continuous>  PureFlow Dialysate RFP-400 (K 2 / Ca 3) 5000 milliLiter(s) CRRT <Continuous>  rasburicase IVPB 8 milliGRAM(s) IV Intermittent daily  sodium bicarbonate  Infusion 0.528 mEq/kG/Hr IV Continuous <Continuous>  thiamine Injectable 100 milliGRAM(s) IV Push daily  vancomycin    Solution 250 milliGRAM(s) Oral every 6 hours  vasopressin Infusion 0.04 Unit(s)/Min IV Continuous <Continuous>    PRN MEDICATIONS    VITALS:   T(F): 103.8  HR: 140  BP: 80/49  RR: 33  SpO2: 98%    LABS:                        6.6    4.82  )-----------( 23       ( 15 Mar 2019 13:40 )             21.9     03-15    144  |  98  |  69<HH>  ----------------------------<  182<H>  4.6   |  5<LL>  |  3.1<H>    Ca    7.5<L>      15 Mar 2019 05:20  Phos  5.8     03-15  Mg     2.2     03-15    TPro  4.1<L>  /  Alb  2.5<L>  /  TBili  4.0<H>  /  DBili  x   /  AST  1096<H>  /  ALT  122<H>  /  AlkPhos  598<H>  03-15    PT/INR - ( 15 Mar 2019 01:40 )   PT: 18.90 sec;   INR: 1.65 ratio         PTT - ( 15 Mar 2019 01:40 )  PTT:57.2 sec  Urinalysis Basic - ( 13 Mar 2019 19:44 )    Color: Yellow / Appearance: Cloudy / S.015 / pH: x  Gluc: x / Ketone: Negative  / Bili: Negative / Urobili: 1.0 mg/dL   Blood: x / Protein: 30 mg/dL / Nitrite: Negative   Leuk Esterase: Negative / RBC: 3-5 /HPF / WBC x   Sq Epi: x / Non Sq Epi: Few /HPF / Bacteria: Few /HPF      ABG - ( 15 Mar 2019 14:01 )  pH, Arterial: 6.98  pH, Blood: x     /  pCO2: 20    /  pO2: 250   / HCO3: 5     / Base Excess: -24.8 /  SaO2: 98                Creatine Kinase, Serum: 100 U/L (03-15-19 @ 01:40)  Troponin T, Serum: <0.01 ng/mL (03-15-19 @ 01:40)      Culture - Body Fluid with Gram Stain (collected 14 Mar 2019 19:18)  Source: .Body Fluid None  Gram Stain (15 Mar 2019 06:44):    No polymorphonuclear cells seen    No organisms seen    by cytocentrifuge    Culture - Blood (collected 14 Mar 2019 04:11)  Source: .Blood None  Preliminary Report (15 Mar 2019 14:00):    No growth to date.    Culture - Blood (collected 13 Mar 2019 15:10)  Source: .Blood Blood  Preliminary Report (15 Mar 2019 14:00):    No growth to date.    Culture - Blood (collected 13 Mar 2019 15:10)  Source: .Blood Blood  Preliminary Report (14 Mar 2019 23:00):    No growth to date.      CARDIAC MARKERS ( 15 Mar 2019 01:40 )  x     / <0.01 ng/mL / 100 U/L / x     / 1.5 ng/mL  CARDIAC MARKERS ( 13 Mar 2019 20:44 )  x     / x     / 53 U/L / x     / x      CARDIAC MARKERS ( 13 Mar 2019 15:05 )  x     / <0.01 ng/mL / x     / x     / x          RADIOLOGY:    PHYSICAL EXAM:  GEN: intubated  LUNGS: Clear to auscultation bilaterally   HEART: Regular  ABD: was distended earlier, had OG tube on low intermittent suction  EXT: noncyanotic/NE/2+PP/VANCE/Skin Intact.   NEURO: intubated    Intravenous access: yes, IJ line, A line, central  NG tube: og tube  Urrutia Catheter:   Indwelling Urethral Catheter:     Connect To:  Straight Drainage/[place]    Indication:  Urinary Retention / Obstruction (19 @ 09:47) (not performed) [active]  Indwelling Urethral Catheter:     Connect To:  Leg Bag    Indication:  Urine Output Monitoring in Critically Ill (19 @ 12:53) (not performed) [active]

## 2019-03-15 NOTE — CONSULT NOTE ADULT - REASON FOR ADMISSION
septic shock

## 2019-03-15 NOTE — CONSULT NOTE ADULT - PROVIDER SPECIALTY LIST ADULT
Critical Care
Gastroenterology
Heme/Onc
Intervent Radiology
Nephrology
Surgery
Vascular Surgery
Infectious Disease

## 2019-03-15 NOTE — CONSULT NOTE ADULT - SUBJECTIVE AND OBJECTIVE BOX
Patient is a 63y old  Female who presents with a chief complaint of septic shock (15 Mar 2019 08:27)      HPI:  64 y/o F with PMHx lymphoma (diagnosed 1 week ago via neck mass biopsy at [facility], not yet started on chemotherapy), HTN, CHF, presented for nausea and vomiting.  Patient originally went to Urgent Care but was hypotensive and sent to the ED.  Patient was hypotensive and febrile in the ED.  She did not respond 5L boluses, so she was started on levophed and vasopressin via RIJ.  Arterial line was placed secondary to labile BP.  CT abdomen/pelvis and RUQ ultrasound showed bulky retroperitoneal and pelvic LAD, c/w lymphoma diagnosis, along with concern for cholecystitis.  Patient underwent percutaneous cholecystomy by IR on 3/14.  She was subsequently found to have pan colitis and tested positive for c. diff.    Patient is in DIC and has been receiving supportive care with PRBCs, platelets, and cryoprecipitate.  Overnight, she was intubated for airway protection.  Lactate is 23.  She is currently requiring pressor support with vasopressin, levophed, and phenylephrine.  At the time of examination, patient passed black diarrhea.  There was concern for tumor lysis syndrome secondary to uric acid of 12.1, LDH >2500, phosphorus of 5.8.  Patient received 0.2 mg/kg of rasburicase overnight.  Once coagulopathy is reversed, plan is for [place] placement and hemodialysis.       ROS:  Negative except for: Unable to obtain.  Patient is intubated and sedated.    PAST MEDICAL & SURGICAL HISTORY:  Hypertension, unspecified type  Lymphoma, unspecified body region, unspecified lymphoma type  Weakness  S/P       SOCIAL HISTORY: Never smoker and no alcohol use, as per chart.    FAMILY HISTORY: Not significant      MEDICATIONS  (STANDING):  cefepime   IVPB 2000 milliGRAM(s) IV Intermittent daily  chlorhexidine 0.12% Liquid 15 milliLiter(s) Oral Mucosa two times a day  chlorhexidine 4% Liquid 1 Application(s) Topical <User Schedule>  CRRT Treatment    <Continuous>  metroNIDAZOLE  IVPB 500 milliGRAM(s) IV Intermittent every 8 hours  midazolam Infusion 0.02 mG/kG/Hr (0.852 mL/Hr) IV Continuous <Continuous>  norepinephrine Infusion 0.05 MICROgram(s)/kG/Min (1.997 mL/Hr) IV Continuous <Continuous>  pantoprazole  Injectable 40 milliGRAM(s) IV Push every 12 hours  phenylephrine    Infusion 0.5 MICROgram(s)/kG/Min (3.994 mL/Hr) IV Continuous <Continuous>  PureFlow Dialysate RFP-400 (K 2 / Ca 3) 5000 milliLiter(s) (1500 mL/Hr) CRRT <Continuous>  rasburicase IVPB 8 milliGRAM(s) IV Intermittent daily  sodium bicarbonate  Infusion 0.528 mEq/kG/Hr (150 mL/Hr) IV Continuous <Continuous>  thiamine Injectable 100 milliGRAM(s) IV Push daily  vancomycin    Solution 250 milliGRAM(s) Oral every 6 hours  vasopressin Infusion 0.04 Unit(s)/Min (2.4 mL/Hr) IV Continuous <Continuous>    MEDICATIONS  (PRN):      Allergies    No Known Allergies    Intolerances    Vital Signs Last 24 Hrs  T(C): 39.7 (15 Mar 2019 08:00), Max: 39.7 (15 Mar 2019 08:00)  T(F): 103.4 (15 Mar 2019 08:00), Max: 103.4 (15 Mar 2019 08:00)  HR: 134 (15 Mar 2019 09:30) (92 - 152)  BP: 80/49 (15 Mar 2019 08:00) (68/38 - 113/53)  BP(mean): 59 (15 Mar 2019 08:00) (48 - 81)  RR: 33 (15 Mar 2019 09:30) (18 - 37)  SpO2: 96% (15 Mar 2019 09:30) (94% - 100%)    PHYSICAL EXAM  General: thin adult, intubated and sedated  HEENT: intubated, no observed bleeding from ET tube  Neck: +RIJ in place, not bleeding from line  Lungs: positive air movement b/l ant lungs  Abdomen: soft  : smart catheter in place  Ext: edema  Neuro: intubated and sedated      LABS:                          8.4    4.82  )-----------( 33       ( 15 Mar 2019 05:20 )             26.8         Mean Cell Volume : 86.2 fL  Mean Cell Hemoglobin : 27.0 pg  Mean Cell Hemoglobin Concentration : 31.3 g/dL  Auto Neutrophil # : 2.34 K/uL  Auto Lymphocyte # : 0.31 K/uL  Auto Monocyte # : 1.28 K/uL  Auto Eosinophil # : 0.00 K/uL  Auto Basophil # : 0.04 K/uL  Auto Neutrophil % : 48.6 %  Auto Lymphocyte % : 6.4 %  Auto Monocyte % : 26.6 %  Auto Eosinophil % : 0.0 %  Auto Basophil % : 0.8 %      Serial CBC's  03-15 @ 05:20  Hct-26.8 / Hgb-8.4 / Plat-33 / RBC-3.11 / WBC-4.82  Serial CBC's  03-15 @ 01:40  Hct-26.8 / Hgb-8.8 / Plat-27 / RBC-3.23 / WBC-3.89  Serial CBC's   @ 13:00  Hct-26.5 / Hgb-8.8 / Plat-49 / RBC-3.22 / WBC-3.97  Serial CBC's   @ 04:20  Hct-20.7 / Hgb-6.9 / Plat-19 / RBC-2.54 / WBC-3.73  Serial CBC's   @ 00:40  Hct-21.2 / Hgb-7.2 / Plat-21 / RBC-2.61 / WBC-3.66  Serial CBC's   @ 19:44  Hct-20.3 / Hgb-7.0 / Plat-20 / RBC-2.48 / WBC-3.82  Serial CBC's   @ 15:05  Hct-28.2 / Hgb-9.0 / Plat-30 / RBC-3.25 / WBC-3.84      03-15    144  |  98  |  69<HH>  ----------------------------<  182<H>  4.6   |  5<LL>  |  3.1<H>    Ca    7.5<L>      15 Mar 2019 05:20  Phos  5.8     03-15  Mg     2.2     03-15    TPro  4.1<L>  /  Alb  2.5<L>  /  TBili  4.0<H>  /  DBili  x   /  AST  1096<H>  /  ALT  122<H>  /  AlkPhos  598<H>  03-15      PT/INR - ( 15 Mar 2019 01:40 )   PT: 18.90 sec;   INR: 1.65 ratio         PTT - ( 15 Mar 2019 01:40 )  PTT:57.2 sec Patient is a 63y old  Female who presents with a chief complaint of septic shock (15 Mar 2019 08:27)      HPI:  62 y/o F with PMHx lymphoma (2018 hospitalization for CHF exacerbation [facility], underwent cervical lymph node biopsy, 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  Patient originally went to Urgent Care but was hypotensive and sent to the ED.  Patient was hypotensive and febrile in the ED.  She did not respond 5L boluses, so she was started on levophed and vasopressin via RIJ.  Arterial line was placed secondary to labile BP.  CT abdomen/pelvis and RUQ ultrasound showed bulky retroperitoneal and pelvic LAD, c/w lymphoma diagnosis, along with concern for cholecystitis.  Patient underwent percutaneous cholecystomy by IR on 3/14.  She was subsequently found to have pan colitis and tested positive for c. diff.    Patient is in DIC and has been receiving supportive care with PRBCs, platelets, and cryoprecipitate.  Overnight, she was intubated for airway protection.  Lactate is 23.  She is currently requiring pressor support with vasopressin, levophed, and phenylephrine.  At the time of examination, patient passed black diarrhea.  There was concern for tumor lysis syndrome secondary to uric acid of 12.1, LDH >2500, phosphorus of 5.8.  Patient received 0.2 mg/kg of rasburicase overnight.  Once coagulopathy is reversed, plan is for [place] placement and hemodialysis.       ROS:  Negative except for: Unable to obtain.  Patient is intubated and sedated.    PAST MEDICAL & SURGICAL HISTORY:  Hypertension, unspecified type  Lymphoma, unspecified body region, unspecified lymphoma type  Weakness  S/P       SOCIAL HISTORY: Never smoker and no alcohol use, as per chart.    FAMILY HISTORY: Not significant      MEDICATIONS  (STANDING):  cefepime   IVPB 2000 milliGRAM(s) IV Intermittent daily  chlorhexidine 0.12% Liquid 15 milliLiter(s) Oral Mucosa two times a day  chlorhexidine 4% Liquid 1 Application(s) Topical <User Schedule>  CRRT Treatment    <Continuous>  metroNIDAZOLE  IVPB 500 milliGRAM(s) IV Intermittent every 8 hours  midazolam Infusion 0.02 mG/kG/Hr (0.852 mL/Hr) IV Continuous <Continuous>  norepinephrine Infusion 0.05 MICROgram(s)/kG/Min (1.997 mL/Hr) IV Continuous <Continuous>  pantoprazole  Injectable 40 milliGRAM(s) IV Push every 12 hours  phenylephrine    Infusion 0.5 MICROgram(s)/kG/Min (3.994 mL/Hr) IV Continuous <Continuous>  PureFlow Dialysate RFP-400 (K 2 / Ca 3) 5000 milliLiter(s) (1500 mL/Hr) CRRT <Continuous>  rasburicase IVPB 8 milliGRAM(s) IV Intermittent daily  sodium bicarbonate  Infusion 0.528 mEq/kG/Hr (150 mL/Hr) IV Continuous <Continuous>  thiamine Injectable 100 milliGRAM(s) IV Push daily  vancomycin    Solution 250 milliGRAM(s) Oral every 6 hours  vasopressin Infusion 0.04 Unit(s)/Min (2.4 mL/Hr) IV Continuous <Continuous>    MEDICATIONS  (PRN):      Allergies    No Known Allergies    Intolerances    Vital Signs Last 24 Hrs  T(C): 39.7 (15 Mar 2019 08:00), Max: 39.7 (15 Mar 2019 08:00)  T(F): 103.4 (15 Mar 2019 08:00), Max: 103.4 (15 Mar 2019 08:00)  HR: 134 (15 Mar 2019 09:30) (92 - 152)  BP: 80/49 (15 Mar 2019 08:00) (68/38 - 113/53)  BP(mean): 59 (15 Mar 2019 08:00) (48 - 81)  RR: 33 (15 Mar 2019 09:30) (18 - 37)  SpO2: 96% (15 Mar 2019 09:30) (94% - 100%)    PHYSICAL EXAM  General: thin adult, intubated and sedated  HEENT: intubated, no observed bleeding from ET tube  Neck: +RIJ in place, not bleeding from line  Lungs: positive air movement b/l ant lungs  Abdomen: soft  : smart catheter in place  Ext: edema  Neuro: intubated and sedated      LABS:                          8.4    4.82  )-----------( 33       ( 15 Mar 2019 05:20 )             26.8         Mean Cell Volume : 86.2 fL  Mean Cell Hemoglobin : 27.0 pg  Mean Cell Hemoglobin Concentration : 31.3 g/dL  Auto Neutrophil # : 2.34 K/uL  Auto Lymphocyte # : 0.31 K/uL  Auto Monocyte # : 1.28 K/uL  Auto Eosinophil # : 0.00 K/uL  Auto Basophil # : 0.04 K/uL  Auto Neutrophil % : 48.6 %  Auto Lymphocyte % : 6.4 %  Auto Monocyte % : 26.6 %  Auto Eosinophil % : 0.0 %  Auto Basophil % : 0.8 %      Serial CBC's  03-15 @ 05:20  Hct-26.8 / Hgb-8.4 / Plat-33 / RBC-3.11 / WBC-4.82  Serial CBC's  03-15 @ 01:40  Hct-26.8 / Hgb-8.8 / Plat-27 / RBC-3.23 / WBC-3.89  Serial CBC's   @ 13:00  Hct-26.5 / Hgb-8.8 / Plat-49 / RBC-3.22 / WBC-3.97  Serial CBC's   @ 04:20  Hct-20.7 / Hgb-6.9 / Plat-19 / RBC-2.54 / WBC-3.73  Serial CBC's   @ 00:40  Hct-21.2 / Hgb-7.2 / Plat-21 / RBC-2.61 / WBC-3.66  Serial CBC's   @ 19:44  Hct-20.3 / Hgb-7.0 / Plat-20 / RBC-2.48 / WBC-3.82  Serial CBC's   @ 15:05  Hct-28.2 / Hgb-9.0 / Plat-30 / RBC-3.25 / WBC-3.84      03-15    144  |  98  |  69<HH>  ----------------------------<  182<H>  4.6   |  5<LL>  |  3.1<H>    Ca    7.5<L>      15 Mar 2019 05:20  Phos  5.8     03-15  Mg     2.2     03-15    TPro  4.1<L>  /  Alb  2.5<L>  /  TBili  4.0<H>  /  DBili  x   /  AST  1096<H>  /  ALT  122<H>  /  AlkPhos  598<H>  03-15      PT/INR - ( 15 Mar 2019 01:40 )   PT: 18.90 sec;   INR: 1.65 ratio         PTT - ( 15 Mar 2019 01:40 )  PTT:57.2 sec

## 2019-03-15 NOTE — PROGRESS NOTE ADULT - ASSESSMENT
62 y/o female w/ PMHx of recently diagnosed lymphoma ( not on ttt), HTN, CHF was sent in from an urgent care for further evaluation for hypotension. Patient was admitted to ICU for sepsis and GI was consulted for concern of GI source. CT imaging shows Pan Colitis and patient tested positive for C-Diff. Imaging also suggestive of Cholecystitis without obstructive pattern. Patient remains in ICU, and was intubated.       Septic Shock  - Treat C- Diff, On Vancomycin   - Agree with broad spectrum ABX  - IV hydration  - LFT pattern likely form sepsis  - Prognosis guarded  - Will follow

## 2019-03-15 NOTE — CHART NOTE - NSCHARTNOTEFT_GEN_A_CORE
RD to see patient for low BMI, however NST team likely to start following. RD to monitor and do assessment if NST team does not start following.

## 2019-03-15 NOTE — PROGRESS NOTE ADULT - ASSESSMENT
Patient with ADONIS presented to hospital for nausea and vomiting.  PMH lymphoma (diagnosed 1 week ago via neck mass, not on chemo), HTN, "heart failure"    ·	 ADONIS / Tumor lysis syndrome/ lactic acidosis / respiratory failure / severe thrombocytopenia/ ?DIC   ·	sp rasbirucase   ·	on bicarb drip   ·	on 2 pressors oligoanuric  ·	would initiate CVVHD, case discussed with violeta at bedside and prognosis discussed which is extremely poor / family aware  ·	keep on bicarb drip until CVVHD initiated  ·	VAscular surgery called for access/ Receiving platelets FFP now   ·	DC rasbirucase after one dose    ·	on cefepime and flagyl / adjust doses to CVVHD     Discussed with family and MICU team at bedside

## 2019-03-15 NOTE — CONSULT NOTE ADULT - ASSESSMENT
63yF    New dx of Lymphoma  Hypertension  Recent hospital admission OSH for HF   BMI 17    Admitted with vomiting, found to have SEVERE SEPSIS ADONIS UREMIA HYPOGLYCEMIA SEVERE DEHYDRATION  Imaging concerning for cholecystitis, s/p percutaneous lisbeth 3/14, no PMN, cx NGTD  Intubated 3/15  On 3 pressors, lactic acidosis >25, DIC, Fevers to 103  Possible tumor lysis s/p rasburicase  +Cdiff but no diarrhea or leukocytosis, however CT AP Circumferential wall thickening of the underdistended colon, suggestive of pancolitis, likely of infectious etiology.  New small bilateral pleural effusions. Stable right inguinal 4.1 x 3.3 cm air/fluid collection and adjacent lymph nodes. Stable diffuse abdominal aorta aneurysmal dilatation measuring up to 4.6 cm  Bcx 3/13 NGTD    Unclear if all cdiff as no diarrhea, however CT with pancolitis, likely ischemia     - Add fidaxomicin 200mg BID PO  - Continue PO vanc 250 q6h, can also give rectally  - Continue IV flagyl 500 q8h  - Continue cefepime 1g q24h, would D/C if repeat bcx NGTD  - If continued diarrhea send GI PCR, stool o&P  - send stronglyoides ab  - HIV CMIA  - Heme/Onc following    Spectra [id number]

## 2019-03-15 NOTE — PROGRESS NOTE ADULT - SUBJECTIVE AND OBJECTIVE BOX
Patient is a 63y old  Female who presents with a chief complaint of septic shock (15 Mar 2019 07:35)        Over Night Events:  On MV.  On [place] 10 mcg and Vaso 0.04.  SP perc Merlyn.  off sedation         ROS:  See HPI    PHYSICAL EXAM    ICU Vital Signs Last 24 Hrs  T(C): 39.3 (15 Mar 2019 06:00), Max: 39.3 (15 Mar 2019 06:00)  T(F): 102.7 (15 Mar 2019 06:00), Max: 102.7 (15 Mar 2019 06:00)  HR: 128 (15 Mar 2019 07:15) (92 - 152)  BP: 108/49 (15 Mar 2019 07:15) (68/38 - 112/58)  BP(mean): 64 (15 Mar 2019 07:15) (48 - 81)  ABP: 94/46 (15 Mar 2019 07:15) (78/42 - 144/52)  ABP(mean): 62 (15 Mar 2019 07:15) (20 - 86)  RR: 33 (15 Mar 2019 07:15) (18 - 37)  SpO2: 96% (15 Mar 2019 07:24) (94% - 100%)      General: No response to pain.   HEENT: + ET            Lymphatic system: No cervical LN   Lungs: Bilateral BS  Cardiovascular: Regular   Gastrointestinal: Soft, Positive BS. Distended   Extremities: No clubbing. .  Full Range of motion   Skin: Warm, intact  Neurological: withdraws to  pain       19 @ 07:01  -  03-15-19 @ 07:00  --------------------------------------------------------  IN:    dextrose 5%: 200 mL    dextrose 5% + sodium chloride 0.45%.: 300 mL    IV PiggyBack: 150 mL    norepinephrine Infusion: 79.7 mL    norepinephrine Infusion: 427.4 mL    Packed Red Blood Cells: 371 mL    pantoprazole Infusion: 230 mL    phenylephrine   Infusion: 255.3 mL    sodium bicarbonate  Infusion: 300 mL    sodium bicarbonate  Infusion: 600 mL    sodium bicarbonate  Infusion: 450 mL    Sodium Chloride 0.9% IV Bolus: 750 mL    vasopressin Infusion: 4.8 mL    vasopressin Infusion: 9.6 mL  Total IN: 4127.8 mL    OUT:    Drain: 40 mL    Indwelling Catheter - Urethral: 1745 mL  Total OUT: 1785 mL    Total NET: 2342.8 mL          LABS:                            8.4    4.82  )-----------( 33       ( 15 Mar 2019 05:20 )             26.8                                               03-15    144  |  98  |  69<HH>  ----------------------------<  182<H>  4.6   |  5<LL>  |  3.1<H>    Ca    7.5<L>      15 Mar 2019 05:20  Phos  5.8     03-15  Mg     2.2     03-15    TPro  4.1<L>  /  Alb  2.5<L>  /  TBili  4.0<H>  /  DBili  x   /  AST  1096<H>  /  ALT  122<H>  /  AlkPhos  598<H>  03-15      PT/INR - ( 15 Mar 2019 01:40 )   PT: 18.90 sec;   INR: 1.65 ratio         PTT - ( 15 Mar 2019 01:40 )  PTT:57.2 sec                                       Urinalysis Basic - ( 13 Mar 2019 19:44 )    Color: Yellow / Appearance: Cloudy / S.015 / pH: x  Gluc: x / Ketone: Negative  / Bili: Negative / Urobili: 1.0 mg/dL   Blood: x / Protein: 30 mg/dL / Nitrite: Negative   Leuk Esterase: Negative / RBC: 3-5 /HPF / WBC x   Sq Epi: x / Non Sq Epi: Few /HPF / Bacteria: Few /HPF        CARDIAC MARKERS ( 15 Mar 2019 01:40 )  x     / <0.01 ng/mL / 100 U/L / x     / 1.5 ng/mL  CARDIAC MARKERS ( 13 Mar 2019 20:44 )  x     / x     / 53 U/L / x     / x      CARDIAC MARKERS ( 13 Mar 2019 15:05 )  x     / <0.01 ng/mL / x     / x     / x                                                LIVER FUNCTIONS - ( 15 Mar 2019 05:20 )  Alb: 2.5 g/dL / Pro: 4.1 g/dL / ALK PHOS: 598 U/L / ALT: 122 U/L / AST: 1096 U/L / GGT: x                                                  Culture - Body Fluid with Gram Stain (collected 14 Mar 2019 19:18)  Source: .Body Fluid None  Gram Stain (15 Mar 2019 06:44):    No polymorphonuclear cells seen    No organisms seen    by cytocentrifuge    Culture - Blood (collected 13 Mar 2019 15:10)  Source: .Blood Blood  Preliminary Report (14 Mar 2019 23:00):    No growth to date.                                                   Mode: AC/ CMV (Assist Control/ Continuous Mandatory Ventilation)  RR (machine): 33  TV (machine): 480  FiO2: 40  PEEP: 5  ITime: 1  MAP: 12  PIP: 29                                      ABG - ( 15 Mar 2019 07:18 )  pH, Arterial: 7.05  pH, Blood: x     /  pCO2: 22    /  pO2: 114   / HCO3: 6     / Base Excess: -22.6 /  SaO2: 96    LAc > 24              MEDICATIONS  (STANDING):  cefepime   IVPB 2000 milliGRAM(s) IV Intermittent daily  chlorhexidine 0.12% Liquid 15 milliLiter(s) Oral Mucosa two times a day  chlorhexidine 4% Liquid 1 Application(s) Topical <User Schedule>  CRRT Treatment    <Continuous>  metroNIDAZOLE  IVPB 500 milliGRAM(s) IV Intermittent every 8 hours  midazolam Infusion 0.02 mG/kG/Hr (0.852 mL/Hr) IV Continuous <Continuous>  norepinephrine Infusion 0.05 MICROgram(s)/kG/Min (1.997 mL/Hr) IV Continuous <Continuous>  pantoprazole Infusion 8 mG/Hr (10 mL/Hr) IV Continuous <Continuous>  phenylephrine    Infusion 0.5 MICROgram(s)/kG/Min (3.994 mL/Hr) IV Continuous <Continuous>  PureFlow Dialysate RFP-400 (K 2 / Ca 3) 5000 milliLiter(s) (1500 mL/Hr) CRRT <Continuous>  rasburicase IVPB 8 milliGRAM(s) IV Intermittent daily  sodium bicarbonate  Infusion 0.528 mEq/kG/Hr (150 mL/Hr) IV Continuous <Continuous>  thiamine Injectable 100 milliGRAM(s) IV Push daily  vancomycin    Solution 250 milliGRAM(s) Oral every 6 hours  vasopressin Infusion 0.04 Unit(s)/Min (2.4 mL/Hr) IV Continuous <Continuous>    MEDICATIONS  (PRN):      Xrays:       ET OK>  TLC OK.                                                                              ECHO

## 2019-03-15 NOTE — PROGRESS NOTE ADULT - SUBJECTIVE AND OBJECTIVE BOX
62 y/o female w/ PMHx of recently diagnosed lymphoma ( not on ttt), HTN, CHF was sent in from an urgent care for further evaluation for hypotension. Patient was admitted to ICU for sepsis and GI was consulted for concern of GI source. CT imaging shows Pan Colitis and patient tested positive for C-Diff. Imaging also suggestive of Cholecystitis without obstructive pattern. Patient in guarded condition. Required intubation. Daughter present this morning. Patient without stools.    Vital Signs:  T(F): 100.7   HR: 142   BP: 97/52   RR: 31   SpO2: 100%   Physical Exam  Gen: NAD, was being changed  HEENT: NC/AT  Cardio: S1/S2 Tachy  Resp: CTA B/L  Abdomen: Soft, distended                          8.4    4.82  )-----------( 33       ( 15 Mar 2019 05:20 )             26.8   03-15    144  |  98  |  69<HH>  ----------------------------<  182<H>  4.6   |  5<LL>  |  3.1<H>    Ca    7.5<L>      15 Mar 2019 05:20  Phos  5.8     03-15  Mg     2.2     03-15    TPro  4.1<L>  /  Alb  2.5<L>  /  TBili  4.0<H>  /  DBili  x   /  AST  1096<H>  /  ALT  122<H>  /  AlkPhos  598<H>  03-15      CT Abdomen and Pelvis w/ Oral Cont 03.14.19  IMPRESSION:   1.  Circumferential wall thickening of the underdistended colon,   suggestive of pancolitis, likely of infectious etiology.     2.  Since March 13, 2019, slightly increased small volume abdominopelvic   ascites.    3.  New small bilateral pleural effusions.     4.  Additional findings are unchanged on the short-term follow-up   examination.

## 2019-03-15 NOTE — PROGRESS NOTE ADULT - SUBJECTIVE AND OBJECTIVE BOX
Nephrology progress note    Patient is seen and examined, events over the last 24 h noted .  case discussed with MICU team overnight   patient intubated / anuric now / severely acidotic   Case discussed with family at bedside     Allergies:  No Known Allergies    Hospital Medications:   MEDICATIONS  (STANDING):  cefepime   IVPB 2000 milliGRAM(s) IV Intermittent daily  metroNIDAZOLE  IVPB 500 milliGRAM(s) IV Intermittent every 8 hours  midazolam Infusion 0.02 mG/kG/Hr (0.852 mL/Hr) IV Continuous <Continuous>  norepinephrine Infusion 0.05 MICROgram(s)/kG/Min (1.997 mL/Hr) IV Continuous <Continuous>  pantoprazole Infusion 8 mG/Hr (10 mL/Hr) IV Continuous <Continuous>  phenylephrine    Infusion 0.5 MICROgram(s)/kG/Min (3.994 mL/Hr) IV Continuous <Continuous>  rasburicase IVPB 8 milliGRAM(s) IV Intermittent daily  sodium bicarbonate  Infusion 0.528 mEq/kG/Hr (150 mL/Hr) IV Continuous <Continuous>  thiamine Injectable 100 milliGRAM(s) IV Push daily  vancomycin    Solution 250 milliGRAM(s) Oral every 6 hours  vasopressin Infusion 0.04 Unit(s)/Min (2.4 mL/Hr) IV Continuous <Continuous>        VITALS:  T(F): 100.7 (03-15-19 @ 00:00), Max: 101 (19 @ 20:00)  HR: 140 (03-15-19 @ 05:15)  BP: 97/52 (19 @ 20:00)  RR: 35 (03-15-19 @ 05:15)  SpO2: 99% (03-15-19 @ 05:15)       @ 07:  -   @ 07:00  --------------------------------------------------------  IN: 2798 mL / OUT: 865 mL / NET: 1933 mL     @ 07:01  -  03-15 @ 07:00  --------------------------------------------------------  IN: 3041.8 mL / OUT: 1760 mL / NET: 1281.8 mL          PHYSICAL EXAM:  Constitutional: intubated on MV   HEENT: anicteric sclera, oropharynx clear, MMM  Neck: No JVD  Respiratory: Crackles at base   Cardiovascular: S1, S2, RRR  Gastrointestinal: BS+, soft, NT/ND  Extremities: No cyanosis or clubbing. No peripheral edema  :  No smart.   Skin: No rashes    LABS:  03-15    144  |  98  |  69<HH>  ----------------------------<  182<H>  4.6   |  5<LL>  |  3.1<H>    Ca    7.5<L>      15 Mar 2019 05:20  Phos  5.8     03-15  Mg     2.2     03-15    TPro  4.1<L>  /  Alb  2.5<L>  /  TBili  4.0<H>  /  DBili      /  AST  1096<H>  /  ALT  122<H>  /  AlkPhos  598<H>  03-15                          8.4    4.82  )-----------( 33       ( 15 Mar 2019 05:20 )             26.8       Urine Studies:  Urinalysis Basic - ( 13 Mar 2019 19:44 )    Color: Yellow / Appearance: Cloudy / S.015 / pH:   Gluc:  / Ketone: Negative  / Bili: Negative / Urobili: 1.0 mg/dL   Blood:  / Protein: 30 mg/dL / Nitrite: Negative   Leuk Esterase: Negative / RBC: 3-5 /HPF / WBC    Sq Epi:  / Non Sq Epi: Few /HPF / Bacteria: Few /HPF        RADIOLOGY & ADDITIONAL STUDIES:

## 2019-03-15 NOTE — CONSULT NOTE ADULT - ATTENDING COMMENTS
Pt intubated.  CT scan reviewed.  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  Please repeat CTA abdomen/ pelvis in 6 months as outpt.  No indication for treatment of AAA presently
Septic shock without clear source  Unclear if gallbladder is culprit  Will continue to follow
Patient seen and examined 3/13/19
multiorgan system dysfunction   unstable for any surgical intervention   will f/u
Patient was seen and examined bedside, case was at length discussed with daughter, who reports that patient initially presented to hospital in the [place] with CHF exacerbation and a lymph node was found in her neck, this was biopsied, they have not received results until March, in the interim another, excisional biopsy was done of the inguinal node, results of this biopsy are not available.   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 Ki67 was 90%. There is extensive adenopathy in the abdomen, some adenopathy also in the axilla, groin.   Patient was admitted with hypotension, she is s/p cholecystostomy, on broad-range antibiotic coverage, colitis was also seen on CT scan, she is positive for C. diff, however daughter does not report any diarrhea at home, patient had 2 very brief hospitalizations, first one in 12/2018, and no history of taking antibiotics. Case was discussed with IR, though no classic features of ischemic colitis were noted, this diagnosis is not excluded.   She is hypotensive on 3 pressors, requiring higher rates, she is in DIC and multiorgan failure.   She is likely in tumor lysis as well (auto tumor lysis, given the extent of her disease it is possible), she is s/p 1 dose of Rasburicase, this usually is sufficient to control hyperuricemia.   Monitor TL panel, DIC panel, transfusional support.   Suggest ID consult and palliative care consult.   I have explained to the daughter that patient is very sick and despite massive effort by ICU is not getting better, I have explained to the daughter that patient may die and death may be imminent.   Daughter understandably is very upset, so could not discuss goals of care further.   Patient is full code for now.
Pt seen and examined  ADONIS likely prerenal in setting of decreased PO intake / vomiting   does not seem vol up no need for lasix   Monitor UOP and Cr

## 2019-03-15 NOTE — CONSULT NOTE ADULT - ASSESSMENT
62 y/o F with PMHx lymphoma (diagnosed 1 week ago via neck mass biopsy at [facility], not yet started on chemotherapy), HTN, CHF, presented for nausea and vomiting.  Patient originally went to Urgent Care but was hypotensive and sent to the ED.  Patient subsequently diagnosed with cholecystitis, s/p percutaneous cholecystostomy, c. diff colitis, and DIC.  Intubated for airway protection and requiring 3 vasopressors to maintain MAP of >60.  Received rasburicase secondary to abnormal tumor lysis syndrome labs.    1. Lymphoma (unspecified type) with DIC and tumor lysis syndrome-s/p rasburicase 0.2 mg/kg x 1.  Would recheck CMP, phosphorus, LDH, and uric acid.  If improved, would not give additional rasburicase.  Best supportive care with aggressive hydration, transfusions as needed, and hemodialysis (once reversal of coagulopathy in order to place [place]).  Overall prognosis is poor.  If patient recovers, will need pathology report from [facility] in order to make further lymphoma treatment recommendations. 62 y/o F with PMHx lymphoma (diagnosed 1 week ago via neck mass biopsy at [facility], not yet started on chemotherapy), HTN, CHF, presented for nausea and vomiting.  Patient originally went to Urgent Care but was hypotensive and sent to the ED.  Patient subsequently diagnosed with cholecystitis, s/p percutaneous cholecystostomy, c. diff colitis, and DIC.  Intubated for airway protection and requiring 3 vasopressors to maintain MAP of >60.  Received rasburicase secondary to abnormal tumor lysis syndrome labs.    1. Lymphoma (unspecified type) with DIC and suspicion for tumor lysis syndrome-s/p rasburicase 0.2 mg/kg x 1.  Would recheck CMP, phosphorus, LDH, and uric acid.  If improved, would not give additional rasburicase.  Best supportive care with aggressive hydration, transfusions as needed, broad-spectrum antibiotics, and hemodialysis (once reversal of coagulopathy in order to place [place]).  Overall prognosis is very poor.  Daughter has copy of pathology report from cervical lymph node biopsy in 12/2018.  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  Patient is too unstable to undergo chemotherapy.  Discussed with daughter, who understands.  Comfort care is appropriate.

## 2019-03-16 LAB
ALBUMIN SERPL ELPH-MCNC: 1.7 G/DL — LOW (ref 3.5–5.2)
ALBUMIN SERPL ELPH-MCNC: 2 G/DL — LOW (ref 3.5–5.2)
ALBUMIN SERPL ELPH-MCNC: 2.1 G/DL — LOW (ref 3.5–5.2)
ALBUMIN SERPL ELPH-MCNC: 2.4 G/DL — LOW (ref 3.5–5.2)
ALBUMIN SERPL ELPH-MCNC: 2.4 G/DL — LOW (ref 3.5–5.2)
ALBUMIN SERPL ELPH-MCNC: 2.5 G/DL — LOW (ref 3.5–5.2)
ALP SERPL-CCNC: 758 U/L — HIGH (ref 30–115)
ALP SERPL-CCNC: 804 U/L — HIGH (ref 30–115)
ALP SERPL-CCNC: 814 U/L — HIGH (ref 30–115)
ALP SERPL-CCNC: 823 U/L — HIGH (ref 30–115)
ALP SERPL-CCNC: 836 U/L — HIGH (ref 30–115)
ALP SERPL-CCNC: 839 U/L — HIGH (ref 30–115)
ALT FLD-CCNC: 275 U/L — HIGH (ref 0–41)
ALT FLD-CCNC: 343 U/L — HIGH (ref 0–41)
ALT FLD-CCNC: 459 U/L — HIGH (ref 0–41)
ALT FLD-CCNC: 490 U/L — HIGH (ref 0–41)
ALT FLD-CCNC: 575 U/L — HIGH (ref 0–41)
ALT FLD-CCNC: 594 U/L — HIGH (ref 0–41)
ANION GAP SERPL CALC-SCNC: 37 MMOL/L — HIGH (ref 7–14)
ANION GAP SERPL CALC-SCNC: 38 MMOL/L — HIGH (ref 7–14)
ANION GAP SERPL CALC-SCNC: 38 MMOL/L — HIGH (ref 7–14)
ANION GAP SERPL CALC-SCNC: 40 MMOL/L — HIGH (ref 7–14)
ANION GAP SERPL CALC-SCNC: 41 MMOL/L — HIGH (ref 7–14)
ANION GAP SERPL CALC-SCNC: 45 MMOL/L — HIGH (ref 7–14)
APTT BLD: >200 SEC — CRITICAL HIGH (ref 27.5–36.3)
AST SERPL-CCNC: 3187 U/L — HIGH (ref 0–41)
AST SERPL-CCNC: 4071 U/L — HIGH (ref 0–41)
AST SERPL-CCNC: 5857 U/L — HIGH (ref 0–41)
AST SERPL-CCNC: 6577 U/L — HIGH (ref 0–41)
AST SERPL-CCNC: 8 U/L — SIGNIFICANT CHANGE UP (ref 0–41)
AST SERPL-CCNC: >7000 U/L — HIGH (ref 0–41)
BASOPHILS # BLD AUTO: 0.04 K/UL — SIGNIFICANT CHANGE UP (ref 0–0.2)
BASOPHILS # BLD AUTO: 0.07 K/UL — SIGNIFICANT CHANGE UP (ref 0–0.2)
BASOPHILS # BLD AUTO: 0.08 K/UL — SIGNIFICANT CHANGE UP (ref 0–0.2)
BASOPHILS # BLD AUTO: 0.08 K/UL — SIGNIFICANT CHANGE UP (ref 0–0.2)
BASOPHILS NFR BLD AUTO: 1.7 % — HIGH (ref 0–1)
BASOPHILS NFR BLD AUTO: 1.8 % — HIGH (ref 0–1)
BASOPHILS NFR BLD AUTO: 1.9 % — HIGH (ref 0–1)
BASOPHILS NFR BLD AUTO: 2.3 % — HIGH (ref 0–1)
BILIRUB SERPL-MCNC: 5.3 MG/DL — HIGH (ref 0.2–1.2)
BILIRUB SERPL-MCNC: 5.7 MG/DL — HIGH (ref 0.2–1.2)
BILIRUB SERPL-MCNC: 5.7 MG/DL — HIGH (ref 0.2–1.2)
BILIRUB SERPL-MCNC: 5.8 MG/DL — HIGH (ref 0.2–1.2)
BILIRUB SERPL-MCNC: 5.9 MG/DL — HIGH (ref 0.2–1.2)
BILIRUB SERPL-MCNC: 6.1 MG/DL — HIGH (ref 0.2–1.2)
BUN SERPL-MCNC: 18 MG/DL — SIGNIFICANT CHANGE UP (ref 10–20)
BUN SERPL-MCNC: 20 MG/DL — SIGNIFICANT CHANGE UP (ref 10–20)
BUN SERPL-MCNC: 24 MG/DL — HIGH (ref 10–20)
BUN SERPL-MCNC: 26 MG/DL — HIGH (ref 10–20)
BUN SERPL-MCNC: 32 MG/DL — HIGH (ref 10–20)
BUN SERPL-MCNC: 37 MG/DL — HIGH (ref 10–20)
CALCIUM SERPL-MCNC: 6.2 MG/DL — LOW (ref 8.5–10.1)
CALCIUM SERPL-MCNC: 6.6 MG/DL — LOW (ref 8.5–10.1)
CALCIUM SERPL-MCNC: 6.8 MG/DL — LOW (ref 8.5–10.1)
CALCIUM SERPL-MCNC: 6.8 MG/DL — LOW (ref 8.5–10.1)
CHLORIDE SERPL-SCNC: 100 MMOL/L — SIGNIFICANT CHANGE UP (ref 98–110)
CHLORIDE SERPL-SCNC: 94 MMOL/L — LOW (ref 98–110)
CHLORIDE SERPL-SCNC: 95 MMOL/L — LOW (ref 98–110)
CHLORIDE SERPL-SCNC: 95 MMOL/L — LOW (ref 98–110)
CHLORIDE SERPL-SCNC: 97 MMOL/L — LOW (ref 98–110)
CHLORIDE SERPL-SCNC: 99 MMOL/L — SIGNIFICANT CHANGE UP (ref 98–110)
CO2 SERPL-SCNC: 10 MMOL/L — LOW (ref 17–32)
CO2 SERPL-SCNC: 19 MMOL/L — SIGNIFICANT CHANGE UP (ref 17–32)
CO2 SERPL-SCNC: 5 MMOL/L — CRITICAL LOW (ref 17–32)
CO2 SERPL-SCNC: 6 MMOL/L — CRITICAL LOW (ref 17–32)
CO2 SERPL-SCNC: 7 MMOL/L — CRITICAL LOW (ref 17–32)
CO2 SERPL-SCNC: 8 MMOL/L — CRITICAL LOW (ref 17–32)
CREAT SERPL-MCNC: 1.1 MG/DL — SIGNIFICANT CHANGE UP (ref 0.7–1.5)
CREAT SERPL-MCNC: 1.2 MG/DL — SIGNIFICANT CHANGE UP (ref 0.7–1.5)
CREAT SERPL-MCNC: 1.4 MG/DL — SIGNIFICANT CHANGE UP (ref 0.7–1.5)
CREAT SERPL-MCNC: 1.5 MG/DL — SIGNIFICANT CHANGE UP (ref 0.7–1.5)
CREAT SERPL-MCNC: 1.8 MG/DL — HIGH (ref 0.7–1.5)
CREAT SERPL-MCNC: 1.9 MG/DL — HIGH (ref 0.7–1.5)
D DIMER BLD IA.RAPID-MCNC: 4978 NG/ML DDU — HIGH (ref 0–230)
EOSINOPHIL # BLD AUTO: 0 K/UL — SIGNIFICANT CHANGE UP (ref 0–0.7)
EOSINOPHIL # BLD AUTO: 0.01 K/UL — SIGNIFICANT CHANGE UP (ref 0–0.7)
EOSINOPHIL NFR BLD AUTO: 0 % — SIGNIFICANT CHANGE UP (ref 0–8)
EOSINOPHIL NFR BLD AUTO: 0.5 % — SIGNIFICANT CHANGE UP (ref 0–8)
GAS PNL BLDA: SIGNIFICANT CHANGE UP
GLUCOSE BLDC GLUCOMTR-MCNC: 101 MG/DL — HIGH (ref 70–99)
GLUCOSE BLDC GLUCOMTR-MCNC: 101 MG/DL — HIGH (ref 70–99)
GLUCOSE BLDC GLUCOMTR-MCNC: 143 MG/DL — HIGH (ref 70–99)
GLUCOSE BLDC GLUCOMTR-MCNC: 146 MG/DL — HIGH (ref 70–99)
GLUCOSE BLDC GLUCOMTR-MCNC: 176 MG/DL — HIGH (ref 70–99)
GLUCOSE BLDC GLUCOMTR-MCNC: 208 MG/DL — HIGH (ref 70–99)
GLUCOSE BLDC GLUCOMTR-MCNC: 224 MG/DL — HIGH (ref 70–99)
GLUCOSE BLDC GLUCOMTR-MCNC: 56 MG/DL — LOW (ref 70–99)
GLUCOSE BLDC GLUCOMTR-MCNC: 59 MG/DL — LOW (ref 70–99)
GLUCOSE BLDC GLUCOMTR-MCNC: 67 MG/DL — LOW (ref 70–99)
GLUCOSE BLDC GLUCOMTR-MCNC: 73 MG/DL — SIGNIFICANT CHANGE UP (ref 70–99)
GLUCOSE BLDC GLUCOMTR-MCNC: 84 MG/DL — SIGNIFICANT CHANGE UP (ref 70–99)
GLUCOSE BLDC GLUCOMTR-MCNC: 89 MG/DL — SIGNIFICANT CHANGE UP (ref 70–99)
GLUCOSE BLDC GLUCOMTR-MCNC: 91 MG/DL — SIGNIFICANT CHANGE UP (ref 70–99)
GLUCOSE BLDC GLUCOMTR-MCNC: 96 MG/DL — SIGNIFICANT CHANGE UP (ref 70–99)
GLUCOSE BLDC GLUCOMTR-MCNC: 99 MG/DL — SIGNIFICANT CHANGE UP (ref 70–99)
GLUCOSE SERPL-MCNC: 111 MG/DL — HIGH (ref 70–99)
GLUCOSE SERPL-MCNC: 117 MG/DL — HIGH (ref 70–99)
GLUCOSE SERPL-MCNC: 136 MG/DL — HIGH (ref 70–99)
GLUCOSE SERPL-MCNC: 154 MG/DL — HIGH (ref 70–99)
GLUCOSE SERPL-MCNC: 70 MG/DL — SIGNIFICANT CHANGE UP (ref 70–99)
GLUCOSE SERPL-MCNC: 82 MG/DL — SIGNIFICANT CHANGE UP (ref 70–99)
HCT VFR BLD CALC: 26 % — LOW (ref 37–47)
HCT VFR BLD CALC: 30.6 % — LOW (ref 37–47)
HCT VFR BLD CALC: 31.4 % — LOW (ref 37–47)
HCT VFR BLD CALC: 32 % — LOW (ref 37–47)
HCT VFR BLD CALC: 33.9 % — LOW (ref 37–47)
HGB BLD-MCNC: 10.2 G/DL — LOW (ref 12–16)
HGB BLD-MCNC: 10.3 G/DL — LOW (ref 12–16)
HGB BLD-MCNC: 10.6 G/DL — LOW (ref 12–16)
HGB BLD-MCNC: 11 G/DL — LOW (ref 12–16)
HGB BLD-MCNC: 9 G/DL — LOW (ref 12–16)
IMM GRANULOCYTES NFR BLD AUTO: 11.7 % — HIGH (ref 0.1–0.3)
IMM GRANULOCYTES NFR BLD AUTO: 14 % — HIGH (ref 0.1–0.3)
IMM GRANULOCYTES NFR BLD AUTO: 14.5 % — HIGH (ref 0.1–0.3)
IMM GRANULOCYTES NFR BLD AUTO: 15.7 % — HIGH (ref 0.1–0.3)
INR BLD: ABNORMAL RATIO (ref 0.88–1.16)
LACTATE SERPL-SCNC: 25.6 MMOL/L — CRITICAL HIGH (ref 0.5–2.2)
LDH SERPL L TO P-CCNC: >2500 — HIGH (ref 50–242)
LYMPHOCYTES # BLD AUTO: 0.1 K/UL — LOW (ref 1.2–3.4)
LYMPHOCYTES # BLD AUTO: 0.1 K/UL — LOW (ref 1.2–3.4)
LYMPHOCYTES # BLD AUTO: 0.11 K/UL — LOW (ref 1.2–3.4)
LYMPHOCYTES # BLD AUTO: 0.12 K/UL — LOW (ref 1.2–3.4)
LYMPHOCYTES # BLD AUTO: 2.4 % — LOW (ref 20.5–51.1)
LYMPHOCYTES # BLD AUTO: 2.6 % — LOW (ref 20.5–51.1)
LYMPHOCYTES # BLD AUTO: 3.6 % — LOW (ref 20.5–51.1)
LYMPHOCYTES # BLD AUTO: 4.5 % — LOW (ref 20.5–51.1)
MAGNESIUM SERPL-MCNC: 1.8 MG/DL — SIGNIFICANT CHANGE UP (ref 1.8–2.4)
MAGNESIUM SERPL-MCNC: 1.8 MG/DL — SIGNIFICANT CHANGE UP (ref 1.8–2.4)
MAGNESIUM SERPL-MCNC: 2 MG/DL — SIGNIFICANT CHANGE UP (ref 1.8–2.4)
MAGNESIUM SERPL-MCNC: 2.2 MG/DL — SIGNIFICANT CHANGE UP (ref 1.8–2.4)
MCHC RBC-ENTMCNC: 28.1 PG — SIGNIFICANT CHANGE UP (ref 27–31)
MCHC RBC-ENTMCNC: 28.1 PG — SIGNIFICANT CHANGE UP (ref 27–31)
MCHC RBC-ENTMCNC: 28.3 PG — SIGNIFICANT CHANGE UP (ref 27–31)
MCHC RBC-ENTMCNC: 28.3 PG — SIGNIFICANT CHANGE UP (ref 27–31)
MCHC RBC-ENTMCNC: 28.9 PG — SIGNIFICANT CHANGE UP (ref 27–31)
MCHC RBC-ENTMCNC: 32.4 G/DL — SIGNIFICANT CHANGE UP (ref 32–37)
MCHC RBC-ENTMCNC: 32.8 G/DL — SIGNIFICANT CHANGE UP (ref 32–37)
MCHC RBC-ENTMCNC: 33.1 G/DL — SIGNIFICANT CHANGE UP (ref 32–37)
MCHC RBC-ENTMCNC: 33.3 G/DL — SIGNIFICANT CHANGE UP (ref 32–37)
MCHC RBC-ENTMCNC: 34.6 G/DL — SIGNIFICANT CHANGE UP (ref 32–37)
MCV RBC AUTO: 81.3 FL — SIGNIFICANT CHANGE UP (ref 81–99)
MCV RBC AUTO: 85 FL — SIGNIFICANT CHANGE UP (ref 81–99)
MCV RBC AUTO: 85.8 FL — SIGNIFICANT CHANGE UP (ref 81–99)
MCV RBC AUTO: 87.1 FL — SIGNIFICANT CHANGE UP (ref 81–99)
MCV RBC AUTO: 87.2 FL — SIGNIFICANT CHANGE UP (ref 81–99)
MONOCYTES # BLD AUTO: 0.1 K/UL — SIGNIFICANT CHANGE UP (ref 0.1–0.6)
MONOCYTES # BLD AUTO: 0.23 K/UL — SIGNIFICANT CHANGE UP (ref 0.1–0.6)
MONOCYTES # BLD AUTO: 0.32 K/UL — SIGNIFICANT CHANGE UP (ref 0.1–0.6)
MONOCYTES # BLD AUTO: 0.39 K/UL — SIGNIFICANT CHANGE UP (ref 0.1–0.6)
MONOCYTES NFR BLD AUTO: 4.5 % — SIGNIFICANT CHANGE UP (ref 1.7–9.3)
MONOCYTES NFR BLD AUTO: 7.6 % — SIGNIFICANT CHANGE UP (ref 1.7–9.3)
MONOCYTES NFR BLD AUTO: 7.6 % — SIGNIFICANT CHANGE UP (ref 1.7–9.3)
MONOCYTES NFR BLD AUTO: 8.4 % — SIGNIFICANT CHANGE UP (ref 1.7–9.3)
NEUTROPHILS # BLD AUTO: 1.66 K/UL — SIGNIFICANT CHANGE UP (ref 1.4–6.5)
NEUTROPHILS # BLD AUTO: 2.18 K/UL — SIGNIFICANT CHANGE UP (ref 1.4–6.5)
NEUTROPHILS # BLD AUTO: 3.21 K/UL — SIGNIFICANT CHANGE UP (ref 1.4–6.5)
NEUTROPHILS # BLD AUTO: 3.34 K/UL — SIGNIFICANT CHANGE UP (ref 1.4–6.5)
NEUTROPHILS NFR BLD AUTO: 71.6 % — SIGNIFICANT CHANGE UP (ref 42.2–75.2)
NEUTROPHILS NFR BLD AUTO: 72 % — SIGNIFICANT CHANGE UP (ref 42.2–75.2)
NEUTROPHILS NFR BLD AUTO: 74.7 % — SIGNIFICANT CHANGE UP (ref 42.2–75.2)
NEUTROPHILS NFR BLD AUTO: 76.4 % — HIGH (ref 42.2–75.2)
NRBC # BLD: 7 /100 WBCS — HIGH (ref 0–0)
NRBC # BLD: 8 /100 WBCS — HIGH (ref 0–0)
NRBC # BLD: 9 /100 WBCS — HIGH (ref 0–0)
PLATELET # BLD AUTO: 10 K/UL — CRITICAL LOW (ref 130–400)
PLATELET # BLD AUTO: 11 K/UL — CRITICAL LOW (ref 130–400)
PLATELET # BLD AUTO: 11 K/UL — CRITICAL LOW (ref 130–400)
PLATELET # BLD AUTO: 12 K/UL — CRITICAL LOW (ref 130–400)
PLATELET # BLD AUTO: 14 K/UL — CRITICAL LOW (ref 130–400)
POTASSIUM SERPL-MCNC: 3.1 MMOL/L — LOW (ref 3.5–5)
POTASSIUM SERPL-MCNC: 3.2 MMOL/L — LOW (ref 3.5–5)
POTASSIUM SERPL-MCNC: 3.4 MMOL/L — LOW (ref 3.5–5)
POTASSIUM SERPL-MCNC: 3.5 MMOL/L — SIGNIFICANT CHANGE UP (ref 3.5–5)
POTASSIUM SERPL-MCNC: 3.7 MMOL/L — SIGNIFICANT CHANGE UP (ref 3.5–5)
POTASSIUM SERPL-MCNC: 3.7 MMOL/L — SIGNIFICANT CHANGE UP (ref 3.5–5)
POTASSIUM SERPL-SCNC: 3.1 MMOL/L — LOW (ref 3.5–5)
POTASSIUM SERPL-SCNC: 3.2 MMOL/L — LOW (ref 3.5–5)
POTASSIUM SERPL-SCNC: 3.4 MMOL/L — LOW (ref 3.5–5)
POTASSIUM SERPL-SCNC: 3.5 MMOL/L — SIGNIFICANT CHANGE UP (ref 3.5–5)
POTASSIUM SERPL-SCNC: 3.7 MMOL/L — SIGNIFICANT CHANGE UP (ref 3.5–5)
POTASSIUM SERPL-SCNC: 3.7 MMOL/L — SIGNIFICANT CHANGE UP (ref 3.5–5)
PROT SERPL-MCNC: 2.9 G/DL — LOW (ref 6–8)
PROT SERPL-MCNC: 3.4 G/DL — LOW (ref 6–8)
PROT SERPL-MCNC: 3.6 G/DL — LOW (ref 6–8)
PROT SERPL-MCNC: 3.8 G/DL — LOW (ref 6–8)
PROT SERPL-MCNC: 4.1 G/DL — LOW (ref 6–8)
PROT SERPL-MCNC: 4.1 G/DL — LOW (ref 6–8)
PROTHROM AB SERPL-ACNC: 147.9 SEC — HIGH (ref 10–13.1)
RBC # BLD: 3.2 M/UL — LOW (ref 4.2–5.4)
RBC # BLD: 3.6 M/UL — LOW (ref 4.2–5.4)
RBC # BLD: 3.66 M/UL — LOW (ref 4.2–5.4)
RBC # BLD: 3.67 M/UL — LOW (ref 4.2–5.4)
RBC # BLD: 3.89 M/UL — LOW (ref 4.2–5.4)
RBC # FLD: 17.4 % — HIGH (ref 11.5–14.5)
RBC # FLD: 17.8 % — HIGH (ref 11.5–14.5)
RBC # FLD: 17.8 % — HIGH (ref 11.5–14.5)
RBC # FLD: 17.9 % — HIGH (ref 11.5–14.5)
RBC # FLD: 18 % — HIGH (ref 11.5–14.5)
SODIUM SERPL-SCNC: 140 MMOL/L — SIGNIFICANT CHANGE UP (ref 135–146)
SODIUM SERPL-SCNC: 142 MMOL/L — SIGNIFICANT CHANGE UP (ref 135–146)
SODIUM SERPL-SCNC: 144 MMOL/L — SIGNIFICANT CHANGE UP (ref 135–146)
SODIUM SERPL-SCNC: 144 MMOL/L — SIGNIFICANT CHANGE UP (ref 135–146)
SODIUM SERPL-SCNC: 145 MMOL/L — SIGNIFICANT CHANGE UP (ref 135–146)
SODIUM SERPL-SCNC: 159 MMOL/L — HIGH (ref 135–146)
WBC # BLD: 2.22 K/UL — LOW (ref 4.8–10.8)
WBC # BLD: 3.03 K/UL — LOW (ref 4.8–10.8)
WBC # BLD: 3.62 K/UL — LOW (ref 4.8–10.8)
WBC # BLD: 4.2 K/UL — LOW (ref 4.8–10.8)
WBC # BLD: 4.66 K/UL — LOW (ref 4.8–10.8)
WBC # FLD AUTO: 2.22 K/UL — LOW (ref 4.8–10.8)
WBC # FLD AUTO: 3.03 K/UL — LOW (ref 4.8–10.8)
WBC # FLD AUTO: 3.62 K/UL — LOW (ref 4.8–10.8)
WBC # FLD AUTO: 4.2 K/UL — LOW (ref 4.8–10.8)
WBC # FLD AUTO: 4.66 K/UL — LOW (ref 4.8–10.8)

## 2019-03-16 RX ORDER — SODIUM BICARBONATE 1 MEQ/ML
100 SYRINGE (ML) INTRAVENOUS
Qty: 0 | Refills: 0 | Status: COMPLETED | OUTPATIENT
Start: 2019-03-16 | End: 2019-03-17

## 2019-03-16 RX ORDER — DEXTROSE 50 % IN WATER 50 %
50 SYRINGE (ML) INTRAVENOUS ONCE
Qty: 0 | Refills: 0 | Status: COMPLETED | OUTPATIENT
Start: 2019-03-16 | End: 2019-03-16

## 2019-03-16 RX ORDER — SODIUM BICARBONATE 1 MEQ/ML
100 SYRINGE (ML) INTRAVENOUS ONCE
Qty: 0 | Refills: 0 | Status: COMPLETED | OUTPATIENT
Start: 2019-03-16 | End: 2019-03-16

## 2019-03-16 RX ORDER — SODIUM BICARBONATE 1 MEQ/ML
50 SYRINGE (ML) INTRAVENOUS ONCE
Qty: 0 | Refills: 0 | Status: COMPLETED | OUTPATIENT
Start: 2019-03-16 | End: 2019-03-16

## 2019-03-16 RX ORDER — EPINEPHRINE 0.3 MG/.3ML
0.1 INJECTION INTRAMUSCULAR; SUBCUTANEOUS
Qty: 4 | Refills: 0 | Status: DISCONTINUED | OUTPATIENT
Start: 2019-03-16 | End: 2019-03-17

## 2019-03-16 RX ORDER — PANTOPRAZOLE SODIUM 20 MG/1
40 TABLET, DELAYED RELEASE ORAL ONCE
Qty: 0 | Refills: 0 | Status: COMPLETED | OUTPATIENT
Start: 2019-03-16 | End: 2019-03-16

## 2019-03-16 RX ORDER — PANTOPRAZOLE SODIUM 20 MG/1
8 TABLET, DELAYED RELEASE ORAL
Qty: 80 | Refills: 0 | Status: DISCONTINUED | OUTPATIENT
Start: 2019-03-16 | End: 2019-03-17

## 2019-03-16 RX ORDER — SODIUM BICARBONATE 1 MEQ/ML
0.88 SYRINGE (ML) INTRAVENOUS
Qty: 150 | Refills: 0 | Status: DISCONTINUED | OUTPATIENT
Start: 2019-03-16 | End: 2019-03-17

## 2019-03-16 RX ORDER — MAGNESIUM SULFATE 500 MG/ML
2 VIAL (ML) INJECTION ONCE
Qty: 0 | Refills: 0 | Status: COMPLETED | OUTPATIENT
Start: 2019-03-16 | End: 2019-03-16

## 2019-03-16 RX ORDER — SODIUM BICARBONATE 1 MEQ/ML
100 SYRINGE (ML) INTRAVENOUS
Qty: 0 | Refills: 0 | Status: DISCONTINUED | OUTPATIENT
Start: 2019-03-16 | End: 2019-03-16

## 2019-03-16 RX ORDER — SODIUM CHLORIDE 9 MG/ML
500 INJECTION INTRAMUSCULAR; INTRAVENOUS; SUBCUTANEOUS ONCE
Qty: 0 | Refills: 0 | Status: DISCONTINUED | OUTPATIENT
Start: 2019-03-16 | End: 2019-03-16

## 2019-03-16 RX ORDER — SODIUM CHLORIDE 9 MG/ML
500 INJECTION, SOLUTION INTRAVENOUS ONCE
Qty: 0 | Refills: 0 | Status: COMPLETED | OUTPATIENT
Start: 2019-03-16 | End: 2019-03-16

## 2019-03-16 RX ADMIN — PHENYLEPHRINE HYDROCHLORIDE 3.99 MICROGRAM(S)/KG/MIN: 10 INJECTION INTRAVENOUS at 08:59

## 2019-03-16 RX ADMIN — Medication 50 MILLIEQUIVALENT(S): at 21:00

## 2019-03-16 RX ADMIN — Medication 100 MILLIEQUIVALENT(S): at 15:36

## 2019-03-16 RX ADMIN — Medication 50 MILLILITER(S): at 17:55

## 2019-03-16 RX ADMIN — Medication 150 MEQ/KG/HR: at 09:01

## 2019-03-16 RX ADMIN — Medication 100 MILLIEQUIVALENT(S): at 12:00

## 2019-03-16 RX ADMIN — Medication 250 MILLIGRAM(S): at 00:47

## 2019-03-16 RX ADMIN — CEFEPIME 100 MILLIGRAM(S): 1 INJECTION, POWDER, FOR SOLUTION INTRAMUSCULAR; INTRAVENOUS at 11:34

## 2019-03-16 RX ADMIN — Medication 100 MILLIEQUIVALENT(S): at 14:13

## 2019-03-16 RX ADMIN — Medication 2 MICROGRAM(S)/KG/MIN: at 21:03

## 2019-03-16 RX ADMIN — FIDAXOMICIN 200 MILLIGRAM(S): 200 GRANULE, FOR SUSPENSION ORAL at 17:56

## 2019-03-16 RX ADMIN — MUPIROCIN 1 APPLICATION(S): 20 OINTMENT TOPICAL at 17:28

## 2019-03-16 RX ADMIN — VASOPRESSIN 2.4 UNIT(S)/MIN: 20 INJECTION INTRAVENOUS at 09:01

## 2019-03-16 RX ADMIN — Medication 250 MILLIGRAM(S): at 11:35

## 2019-03-16 RX ADMIN — Medication 100 MILLIGRAM(S): at 05:33

## 2019-03-16 RX ADMIN — Medication 100 MILLIEQUIVALENT(S): at 16:30

## 2019-03-16 RX ADMIN — MUPIROCIN 1 APPLICATION(S): 20 OINTMENT TOPICAL at 05:32

## 2019-03-16 RX ADMIN — CHLORHEXIDINE GLUCONATE 15 MILLILITER(S): 213 SOLUTION TOPICAL at 17:03

## 2019-03-16 RX ADMIN — Medication 100 MILLIEQUIVALENT(S): at 08:58

## 2019-03-16 RX ADMIN — Medication 50 MILLILITER(S): at 02:45

## 2019-03-16 RX ADMIN — CHLORHEXIDINE GLUCONATE 1 APPLICATION(S): 213 SOLUTION TOPICAL at 05:33

## 2019-03-16 RX ADMIN — Medication 100 MILLIGRAM(S): at 12:46

## 2019-03-16 RX ADMIN — Medication 250 MILLIGRAM(S): at 05:32

## 2019-03-16 RX ADMIN — FIDAXOMICIN 200 MILLIGRAM(S): 200 GRANULE, FOR SUSPENSION ORAL at 07:27

## 2019-03-16 RX ADMIN — Medication 2 MICROGRAM(S)/KG/MIN: at 08:59

## 2019-03-16 RX ADMIN — Medication 250 MILLIGRAM(S): at 17:03

## 2019-03-16 RX ADMIN — Medication 100 MILLIEQUIVALENT(S): at 19:30

## 2019-03-16 RX ADMIN — PHENYLEPHRINE HYDROCHLORIDE 3.99 MICROGRAM(S)/KG/MIN: 10 INJECTION INTRAVENOUS at 21:04

## 2019-03-16 RX ADMIN — PANTOPRAZOLE SODIUM 40 MILLIGRAM(S): 20 TABLET, DELAYED RELEASE ORAL at 05:32

## 2019-03-16 RX ADMIN — VASOPRESSIN 2.4 UNIT(S)/MIN: 20 INJECTION INTRAVENOUS at 21:04

## 2019-03-16 RX ADMIN — RASBURICASE 110.66 MILLIGRAM(S): KIT at 12:46

## 2019-03-16 RX ADMIN — Medication 100 MILLIEQUIVALENT(S): at 19:40

## 2019-03-16 RX ADMIN — Medication 250 MEQ/KG/HR: at 21:03

## 2019-03-16 RX ADMIN — Medication 100 MILLIEQUIVALENT(S): at 17:56

## 2019-03-16 RX ADMIN — PANTOPRAZOLE SODIUM 40 MILLIGRAM(S): 20 TABLET, DELAYED RELEASE ORAL at 17:03

## 2019-03-16 RX ADMIN — Medication 50 MILLILITER(S): at 14:42

## 2019-03-16 RX ADMIN — Medication 50 MILLILITER(S): at 19:50

## 2019-03-16 RX ADMIN — PHENYLEPHRINE HYDROCHLORIDE 3.99 MICROGRAM(S)/KG/MIN: 10 INJECTION INTRAVENOUS at 05:31

## 2019-03-16 RX ADMIN — CHLORHEXIDINE GLUCONATE 15 MILLILITER(S): 213 SOLUTION TOPICAL at 05:32

## 2019-03-16 RX ADMIN — Medication 50 GRAM(S): at 03:10

## 2019-03-16 RX ADMIN — Medication 100 MILLIGRAM(S): at 13:34

## 2019-03-16 RX ADMIN — SODIUM CHLORIDE 1000 MILLILITER(S): 9 INJECTION, SOLUTION INTRAVENOUS at 23:30

## 2019-03-16 NOTE — DIETITIAN INITIAL EVALUATION ADULT. - SOURCE
NKFA per chart. No family at bedside. Attempted multiple times to reach emergency contact number, however no response. Unable to obtain nutrition history at this time.

## 2019-03-16 NOTE — CHART NOTE - NSCHARTNOTEFT_GEN_A_CORE
Upon Nutritional Assessment by the Registered Dietitian your patient was determined to meet criteria / has evidence of the following diagnosis/diagnoses:          [ ]  Mild Protein Calorie Malnutrition        [ ]  Moderate Protein Calorie Malnutrition        [x] Severe Protein Calorie Malnutrition        [ ] Unspecified Protein Calorie Malnutrition        [x] Underweight / BMI <19        [ ] Morbid Obesity / BMI > 40      Findings as based on:  •  Comprehensive nutrition assessment and consultation    BMI <19 indicators:  (1) Ht: 154.94 cm.  (2) Wt: 42.6 kg.  (3) BMI 17.8    Severe Protein Calorie Malnutrition Indicators:  (1) Severe muscle wasting (temporal)  (2) Severe fat loss (orbital)      Treatment:    The following diet has been recommended: See RD assessment.      PROVIDER Section:     By signing this assessment you are acknowledging and agree with the diagnosis/diagnoses assigned by the Registered Dietitian    Comments:

## 2019-03-16 NOTE — DIETITIAN INITIAL EVALUATION ADULT. - PHYSICAL APPEARANCE
BMI: 17.8. Intubated. Last BM 3/15. Abd noted distended. OG tube. B/L sacrum sDTI. Noted cachectic. Severe muscle wasting (temporal) & severe fat loss (orbital).

## 2019-03-16 NOTE — PROGRESS NOTE ADULT - SUBJECTIVE AND OBJECTIVE BOX
Patient is a 63y old  Female who presents with a chief complaint of septic shock (16 Mar 2019 00:31)        Over Night Events: on MV.  Multiple pressors.          ROS:  See HPI    PHYSICAL EXAM    ICU Vital Signs Last 24 Hrs  T(C): 35.3 (16 Mar 2019 04:00), Max: 39.9 (15 Mar 2019 12:00)  T(F): 95.5 (16 Mar 2019 04:00), Max: 103.8 (15 Mar 2019 12:00)  HR: 123 (16 Mar 2019 07:31) (116 - 144)  BP: --  BP(mean): --  ABP: 98/58 (16 Mar 2019 06:45) (78/40 - 126/64)  ABP(mean): 72 (16 Mar 2019 06:45) (54 - 86)  RR: 33 (16 Mar 2019 06:45) (18 - 36)  SpO2: 97% (16 Mar 2019 07:31) (50% - 100%)      General: In NAD   HEENT: +ET              Lymphatic system: No cervical LN   Lungs: Bilateral BS  Cardiovascular: Regular   Gastrointestinal: Soft, distended   Extremities: No clubbing.  Moves extremities.  Full Range of motion   Skin: Warm, intact  Neurological: No motor deficit       03-15-19 @ 07:01  -  03-16-19 @ 07:00  --------------------------------------------------------  IN:    Cryoprecipitate: 100 mL    IV PiggyBack: 200 mL    norepinephrine Infusion: 2016.8 mL    Other: 100 mL    Packed Red Blood Cells: 576 mL    pantoprazole Infusion: 10 mL    phenylephrine   Infusion: 1896 mL    Platelets - Single Donor: 234 mL    sodium bicarbonate  Infusion: 3600 mL    vasopressin Infusion: 57.6 mL  Total IN: 8790.4 mL    OUT:    Drain: 75 mL    Indwelling Catheter - Urethral: 40 mL    Nasoenteral Tube: 200 mL    Other: 4128 mL  Total OUT: 4443 mL    Total NET: 4347.4 mL          LABS:                            11.0   4.66  )-----------( 14       ( 16 Mar 2019 02:47 )             33.9                                               03-16    142  |  97<L>  |  32<H>  ----------------------------<  82  3.7   |  5<LL>  |  1.8<H>    Ca    6.8<L>      16 Mar 2019 02:47  Phos  8.9     03-15  Mg     1.8     03-16    TPro  4.1<L>  /  Alb  2.4<L>  /  TBili  5.9<H>  /  DBili  x   /  AST  4071<H>  /  ALT  343<H>  /  AlkPhos  814<H>  03-16      PT/INR - ( 15 Mar 2019 01:40 )   PT: 18.90 sec;   INR: 1.65 ratio         PTT - ( 15 Mar 2019 01:40 )  PTT:57.2 sec                                           CARDIAC MARKERS ( 15 Mar 2019 01:40 )  x     / <0.01 ng/mL / 100 U/L / x     / 1.5 ng/mL                                            LIVER FUNCTIONS - ( 16 Mar 2019 02:47 )  Alb: 2.4 g/dL / Pro: 4.1 g/dL / ALK PHOS: 814 U/L / ALT: 343 U/L / AST: 4071 U/L / GGT: x                                                  Culture - Body Fluid with Gram Stain (collected 14 Mar 2019 19:18)  Source: .Body Fluid None  Gram Stain (15 Mar 2019 06:44):    No polymorphonuclear cells seen    No organisms seen    by cytocentrifuge    Culture - Blood (collected 14 Mar 2019 04:11)  Source: .Blood None  Preliminary Report (15 Mar 2019 14:00):    No growth to date.    Culture - Blood (collected 13 Mar 2019 15:10)  Source: .Blood Blood  Preliminary Report (15 Mar 2019 14:00):    No growth to date.    Culture - Blood (collected 13 Mar 2019 15:10)  Source: .Blood Blood  Preliminary Report (14 Mar 2019 23:00):    No growth to date.                                                   Mode: AC/ CMV (Assist Control/ Continuous Mandatory Ventilation)  RR (machine): 33  TV (machine): 480  FiO2: 50  PEEP: 0.5  ITime: 1  MAP: 15  PIP: 28                                      ABG - ( 16 Mar 2019 07:43 )  pH, Arterial: 7.04  pH, Blood: x     /  pCO2: 22    /  pO2: 130   / HCO3: 6     / Base Excess: -22.9 /  SaO2: 97    Lac > 25              MEDICATIONS  (STANDING):  cefepime   IVPB 2000 milliGRAM(s) IV Intermittent daily  chlorhexidine 0.12% Liquid 15 milliLiter(s) Oral Mucosa two times a day  chlorhexidine 4% Liquid 1 Application(s) Topical <User Schedule>  CRRT Treatment    <Continuous>  fidaxomicin 200 milliGRAM(s) Oral two times a day  metroNIDAZOLE  IVPB 500 milliGRAM(s) IV Intermittent every 8 hours  midazolam Infusion 0.02 mG/kG/Hr (0.852 mL/Hr) IV Continuous <Continuous>  mupirocin 2% Ointment 1 Application(s) Topical two times a day  norepinephrine Infusion 0.05 MICROgram(s)/kG/Min (1.997 mL/Hr) IV Continuous <Continuous>  pantoprazole  Injectable 40 milliGRAM(s) IV Push every 12 hours  phenylephrine    Infusion 0.5 MICROgram(s)/kG/Min (3.994 mL/Hr) IV Continuous <Continuous>  PureFlow Dialysate RFP-400 (K 2 / Ca 3) 5000 milliLiter(s) (1500 mL/Hr) CRRT <Continuous>  rasburicase IVPB 8 milliGRAM(s) IV Intermittent daily  sodium bicarbonate  Infusion 0.528 mEq/kG/Hr (150 mL/Hr) IV Continuous <Continuous>  thiamine Injectable 100 milliGRAM(s) IV Push daily  vancomycin    Solution 250 milliGRAM(s) Oral every 6 hours  vasopressin Infusion 0.04 Unit(s)/Min (2.4 mL/Hr) IV Continuous <Continuous>    MEDICATIONS  (PRN):      Xrays:      ET TLC OK>  No infiltrates                                                                                ECHO

## 2019-03-16 NOTE — DIETITIAN INITIAL EVALUATION ADULT. - ENERGY NEEDS
Energy: 1695 kcal/day ([id number])    Protein: 77-94 g/day (1.8-2.2 g/kg ABW) - increased above typical range d/t increased protein requirements in pt receiving CVVHD.    Fluids: per ICU team

## 2019-03-16 NOTE — PROGRESS NOTE ADULT - SUBJECTIVE AND OBJECTIVE BOX
Progress Note: Surgery  Patient: SALONI COOPER , 63y (1955)Female   MRN: [id number]  Location: [place] [address] A  Visit: 03-13-19 Inpatient  Date: 03-16-19 @ 00:29    Hospital Day: 4    Procedure/Injury: septic shock    Events over 24h: udall placement for urgent CVVH, groin checked with good pulses, discussed patient not a surgical candidate at this time with family     Vitals: T(F): 97.2 (03-15-19 @ 20:00), Max: 103.8 (03-15-19 @ 12:00)  HR: 119 (03-15-19 @ 23:53)  BP: 80/49 (03-15-19 @ 08:00) (80/49 - 113/53)  RR: 32 (03-15-19 @ 23:00)  SpO2: 100% (03-15-19 @ 23:53)    Diet: Diet, NPO:   Except Medications (03-13-19 @ 21:19)    Out:   03-14-19 @ 07:01  -  03-15-19 @ 07:00  --------------------------------------------------------  OUT:    Drain: 40 mL    Indwelling Catheter - Urethral: 1745 mL  Total OUT: 1785 mL      03-15-19 @ 07:01  -  03-16-19 @ 00:29  --------------------------------------------------------  OUT:    Drain: 45 mL    Indwelling Catheter - Urethral: 15 mL    Nasoenteral Tube: 100 mL    Other: 665 mL  Total OUT: 825 mL    PHYSICAL EXAM:  GENERAL: intubated, on pressors  CHEST/LUNG: Coarse to auscultation bilaterally  HEART: Tachycardic rate and rhythm  ABDOMEN: Soft, distended;      Medications: [Standing]  cefepime   IVPB 2000 milliGRAM(s) IV Intermittent daily  chlorhexidine 0.12% Liquid 15 milliLiter(s) Oral Mucosa two times a day  chlorhexidine 4% Liquid 1 Application(s) Topical <User Schedule>  CRRT Treatment    <Continuous>  fidaxomicin 200 milliGRAM(s) Oral two times a day  metroNIDAZOLE  IVPB 500 milliGRAM(s) IV Intermittent every 8 hours  midazolam Infusion 0.02 mG/kG/Hr (0.852 mL/Hr) IV Continuous <Continuous>  mupirocin 2% Ointment 1 Application(s) Topical two times a day  norepinephrine Infusion 0.05 MICROgram(s)/kG/Min (1.997 mL/Hr) IV Continuous <Continuous>  pantoprazole  Injectable 40 milliGRAM(s) IV Push every 12 hours  phenylephrine    Infusion 0.5 MICROgram(s)/kG/Min (3.994 mL/Hr) IV Continuous <Continuous>  PureFlow Dialysate RFP-400 (K 2 / Ca 3) 5000 milliLiter(s) (1500 mL/Hr) CRRT <Continuous>  rasburicase IVPB 8 milliGRAM(s) IV Intermittent daily  sodium bicarbonate  Infusion 0.528 mEq/kG/Hr (150 mL/Hr) IV Continuous <Continuous>  thiamine Injectable 100 milliGRAM(s) IV Push daily  vancomycin    Solution 250 milliGRAM(s) Oral every 6 hours  vasopressin Infusion 0.04 Unit(s)/Min (2.4 mL/Hr) IV Continuous <Continuous>    Medications:[PRN]    Labs:                        11.0   5.18  )-----------( 18       ( 15 Mar 2019 20:40 )             34.7     03-15    140  |  95<L>  |  37<H>  ----------------------------<  136<H>  3.5   |  x   |  1.9<H>    Ca    6.6<L>      15 Mar 2019 23:30  Phos  8.9     03-15  Mg     1.8     03-15    TPro  4.1<L>  /  Alb  2.5<L>  /  TBili  5.3<H>  /  DBili  x   /  AST  x   /  ALT  275<H>  /  AlkPhos  804<H>  03-15    LIVER FUNCTIONS - ( 15 Mar 2019 23:30 )  Alb: 2.5 g/dL / Pro: 4.1 g/dL / ALK PHOS: 804 U/L / ALT: 275 U/L / AST: x     / GGT: x           PT/INR - ( 15 Mar 2019 01:40 )   PT: 18.90 sec;   INR: 1.65 ratio         PTT - ( 15 Mar 2019 01:40 )  PTT:57.2 sec  ABG - ( 15 Mar 2019 19:47 )  pH: 7.06  /  pCO2: 23    /  pO2: 219   / HCO3: 6     / Base Excess: -22.4 /  SaO2: 99        CARDIAC MARKERS ( 15 Mar 2019 01:40 )  x     / <0.01 ng/mL / 100 U/L / x     / 1.5 ng/mL    RR (machine): 33, TV (machine): 480, FiO2: 50, PEEP: 5, PIP: 29    Imaging:  None/24h    Date/Time: 03-16-19 @ 00:29

## 2019-03-16 NOTE — PROGRESS NOTE ADULT - ASSESSMENT
63 yoF with pmhx of lymphoma diagnosed 1 week ago, and chf with unspecified EF presented in septic shock 2/2 likely abdominal process as of yet unspecified    GASTROINTESTINAL  # Septic shock 2/2 cholecystitis vs. pancolitis from cdif vs. autoimmune?  - ID following: Continue fidaxomicin 200mg BID PO; PO vanc 250 q6h, (can also give rectally); IV flagyl 500 q8h; Continue cefepime 1g q24h  - F/u stool studies  - having dark bloody bowel movements  - exquisite RUQ tenderness and surgical abdomen / abd ultrasound shows pericholecystic fluid and positive monroy's sign consistent with acute cholecystitis / prelim read of CT a/p shows pancolitis no perforation  - perc lisbeth done by IR 3/14/19, sludge like fluid no clear infection    HEMATOLOGY  # Likely DIC 2/2 septic shock 2/2 cholecystitis  - s/p 2 more units prbc with appropriate response in Hb  - s/p 2 more units platelets. Platelets remain at 18. INR 1.65  - seen by hem/onc, unlikely to be tumor lysis because of no chemo hx, f/u cmp, phosphorus, ldh, uric acid, if improved then no rasburicase    RENAL  # ADONIS likely prerenal 2/2 septic shock  - unknown baseline, Cr on admission was 3.1. Cr is improving  - on CVVHD  - nephro following    NEUROLOGICAL  - intubated, withdrawing to pain    RESPIRATORY  - intubated    CARDIOVASCULAR  # EF 40% grade 1 diastolic dysfunction  - LE doppler negative    FEEDING/ELECTROLYTES/NUTRITION  - not getting TPN, will monitor after CVVHD  - sodium/potassium wnl    PLAN: Monitor for improvement in lactic acidosis and bicarb during cvvhd, F/u ABG (from a line), f/u fluid cultures gallbladder, monitor for signs of increasing sepsis    DVT PPX on scds  GI PPX protonix BID  FULL CODE  VERY POOR PROGNOSIS. Family aware.

## 2019-03-16 NOTE — PROGRESS NOTE ADULT - ASSESSMENT
IMPRESSION:    Sepsis septic shock  Cholecystitis SP Perc lisbeth  CDiff   Recent DX of Lymphoma  Lactic acidosis worsening   MOF    PLAN:    CNS: No depressants.  FU MS>      HEENT: Oral care    PULMONARY:  HOB @ 45 degrees.  No vent changes X FiO2 40 %    CARDIOVASCULAR: Wean Bradley ABG Q4.  NaHCo3 250     GI: GI prophylaxis.  NPO.  FU with surgery.  Protonix BID     RENAL:  Follow up lytes.  Correct as needed.  Agree with CVVHD.  I=O    INFECTIOUS DISEASE: Follow up cultures.  Cefipime and Flogyl.  Vanc 250 Q6    HEMATOLOGICAL:  DVT prophylaxis.  FU CBC and coags     ENDOCRINE:  Follow up FS.  Insulin protocol if needed.    MUSCULOSKELETAL:  OOB to chair     Keep Urrutia for Bladder pressure    Prognosis poor

## 2019-03-16 NOTE — PROGRESS NOTE ADULT - ASSESSMENT
Patient with ADONIS presented to hospital for nausea and vomiting.  PMH lymphoma (diagnosed 1 week ago via neck mass, not on chemo), HTN, "heart failure"    ·	 ADONIS / Tumor lysis syndrome/ lactic acidosis / respiratory failure / severe thrombocytopenia/ ?DIC   ·	sp rasbirucase   ·	on bicarb drip continue   ·	on 3 pressors oligoanuric  ·	continue CVVHD keep in even balance   ·	C diff colitis on vanco po   ·	DC rasbirucase   ·	on cefepime and flagyl and vanco po fidaxomycin  / adjust doses to CVVHD     prognosis very poor

## 2019-03-16 NOTE — PROGRESS NOTE ADULT - SUBJECTIVE AND OBJECTIVE BOX
SUBJECTIVE:    Patient is a 63y old Female who presents with a chief complaint of nausea/weakness. (15 Mar 2019 12:14)    Currently admitted to medicine with the primary diagnosis of Septic Shock.      Patient was seen and examined at bedside. Remains on mechanical ventilation. Sedated with Versed. Requiring pressors. On CVVHD.     PAST MEDICAL & SURGICAL HISTORY  Hypertension, unspecified type  Lymphoma, unspecified body region, unspecified lymphoma type  Weakness  S/P     SOCIAL HISTORY:  Negative X3    ALLERGIES:  No Known Allergies    MEDICATIONS:  STANDING MEDICATIONS  cefepime   IVPB 2000 milliGRAM(s) IV Intermittent daily  chlorhexidine 0.12% Liquid 15 milliLiter(s) Oral Mucosa two times a day  chlorhexidine 4% Liquid 1 Application(s) Topical <User Schedule>  CRRT Treatment    <Continuous>  fidaxomicin 200 milliGRAM(s) Oral two times a day  fidaxomicin 200 milliGRAM(s) Oral once  metroNIDAZOLE  IVPB 500 milliGRAM(s) IV Intermittent every 8 hours  midazolam Infusion 0.02 mG/kG/Hr IV Continuous <Continuous>  mupirocin 2% Ointment 1 Application(s) Topical two times a day  norepinephrine Infusion 0.05 MICROgram(s)/kG/Min IV Continuous <Continuous>  pantoprazole  Injectable 40 milliGRAM(s) IV Push every 12 hours  phenylephrine    Infusion 0.5 MICROgram(s)/kG/Min IV Continuous <Continuous>  PureFlow Dialysate RFP-400 (K 2 / Ca 3) 5000 milliLiter(s) CRRT <Continuous>  rasburicase IVPB 8 milliGRAM(s) IV Intermittent daily  sodium bicarbonate  Infusion 0.528 mEq/kG/Hr IV Continuous <Continuous>  thiamine Injectable 100 milliGRAM(s) IV Push daily  vancomycin    Solution 250 milliGRAM(s) Oral every 6 hours  vasopressin Infusion 0.04 Unit(s)/Min IV Continuous <Continuous>    PRN MEDICATIONS    VITALS:   Vital Signs (24 Hrs):  T(C): 36.2 (03-15-19 @ 20:00), Max: 39.9 (03-15-19 @ 12:00)  HR: 119 (03-15-19 @ 23:53) (119 - 152)  BP: 80/49 (03-15-19 @ 08:00) (80/49 - 113/53)  RR: 32 (03-15-19 @ 23:00) (18 - 37)  SpO2: 100% (03-15-19 @ 23:53) (84% - 100%)  Wt(kg): --  Daily     Daily     I&O's Summary    14 Mar 2019 07:  -  15 Mar 2019 07:00  --------------------------------------------------------  IN: 4127.8 mL / OUT: 1785 mL / NET: 2342.8 mL    15 Mar 2019 07:  -  16 Mar 2019 00:39  --------------------------------------------------------  IN: 4575.6 mL / OUT: 825 mL / NET: 3750.6 mL        LABS:                                   11.0   5.18  )-----------( 18       ( 15 Mar 2019 20:40 )             34.7       03-15    140  |  95<L>  |  37<H>  ----------------------------<  136<H>  3.5   |  7<LL>  |  1.9<H>    Ca    6.6<L>      15 Mar 2019 23:30  Phos  8.9     -15  Mg     1.8     15    TPro  4.1<L>  /  Alb  2.5<L>  /  TBili  5.3<H>  /  DBili  x   /  AST  3187<H>  /  ALT  275<H>  /  AlkPhos  804<H>  15      PT/INR - ( 15 Mar 2019 01:40 )   PT: 18.90 sec;   INR: 1.65 ratio         PTT - ( 15 Mar 2019 01:40 )  PTT:57.2 sec      RADIOLOGY:  < from: CT Abdomen and Pelvis w/ Oral Cont (19 @ 14:24) >    IMPRESSION:   1.  Circumferential wall thickening of the underdistended colon,   suggestive of pancolitis, likely of infectious etiology.     2.  Since 2019, slightly increased small volume abdominopelvic   ascites.    3.  New small bilateral pleural effusions.     4.  Additional findings are unchanged on the short-term follow-up   examination.    < end of copied text >    < from: CT Abdomen and Pelvis No Cont (19 @ 18:22) >  IMPRESSION:     1. Aneurysmal dilation of the abdominal aorta up to 4.3 cm with a focal   area of crescentic wall density suggestive of an intramural hematoma.    2. Gallbladder wall edema and perihepatic fluid. Findings suggestive of   underlying cholecystitis.    3. Extensive retroperitoneal and pelvic lymphadenopathy with associated   splenomegaly. Findings compatible with underlying lymphoma.    4. Air and fluid collection in the anterior thigh measuring up to 5.2 cm.    < end of copied text >        PHYSICAL EXAM:  General: Intubated; Withdraws to pain  HEENT: + ET            Lymphatic system: No cervical LN   Lungs: Bilateral BS  Cardiovascular: Regular   Gastrointestinal: Soft, Positive BS. Distended   Extremities: Moves all extremities   Skin: Warm, intact  Neurological: withdraws to pain     Intravenous access: IJ line, A line, central  NG tube: og tube  Urrutia Catheter:   Indwelling Urethral Catheter:     Connect To:  Straight Drainage/[place]    Indication:  Urinary Retention / Obstruction (19 @ 09:47) (not performed) [active]  Indwelling Urethral Catheter:     Connect To:  Leg Bag    Indication:  Urine Output Monitoring in Critically Ill (19 @ 12:53) (not performed) [active]

## 2019-03-16 NOTE — PROGRESS NOTE ADULT - ASSESSMENT
A/P:  SALONI COOPER is a 63yFemale HD4 in septic shock.     Plan:  -continue oral vanc  - CVVH as needed  -continue on vent, given acidosis  -continue on pressors  -continue fluids, low UOP  -no surgical intervention, drains still in place  -poor prognosis

## 2019-03-16 NOTE — PROGRESS NOTE ADULT - SUBJECTIVE AND OBJECTIVE BOX
Nephrology progress note    Patient is seen and examined, events over the last 24 h noted .    Allergies:  No Known Allergies    Hospital Medications:   MEDICATIONS  (STANDING):  cefepime   IVPB 2000 milliGRAM(s) IV Intermittent daily  chlorhexidine 0.12% Liquid 15 milliLiter(s) Oral Mucosa two times a day  chlorhexidine 4% Liquid 1 Application(s) Topical <User Schedule>  CRRT Treatment    <Continuous>  fidaxomicin 200 milliGRAM(s) Oral two times a day  metroNIDAZOLE  IVPB 500 milliGRAM(s) IV Intermittent every 8 hours  midazolam Infusion 0.02 mG/kG/Hr (0.852 mL/Hr) IV Continuous <Continuous>  mupirocin 2% Ointment 1 Application(s) Topical two times a day  norepinephrine Infusion 0.05 MICROgram(s)/kG/Min (1.997 mL/Hr) IV Continuous <Continuous>  pantoprazole  Injectable 40 milliGRAM(s) IV Push every 12 hours  phenylephrine    Infusion 0.5 MICROgram(s)/kG/Min (3.994 mL/Hr) IV Continuous <Continuous>  PureFlow Dialysate RFP-400 (K 2 / Ca 3) 5000 milliLiter(s) (1500 mL/Hr) CRRT <Continuous>  rasburicase IVPB 8 milliGRAM(s) IV Intermittent daily  sodium bicarbonate  Infusion 0.528 mEq/kG/Hr (150 mL/Hr) IV Continuous <Continuous>  thiamine Injectable 100 milliGRAM(s) IV Push daily  vancomycin    Solution 250 milliGRAM(s) Oral every 6 hours  vasopressin Infusion 0.04 Unit(s)/Min (2.4 mL/Hr) IV Continuous <Continuous>        VITALS:  T(F): 95.5 (19 @ 04:00), Max: 103.8 (03-15-19 @ 12:00)  HR: 123 (19 @ 07:31)  BP: --  RR: 33 (19 @ 06:45)  SpO2: 97% (19 @ 07:31)  Wt(kg): --     @ 07:01  -  03-15 @ 07:00  --------------------------------------------------------  IN: 4127.8 mL / OUT: 1785 mL / NET: 2342.8 mL    03-15 @ 07:01  -  16 @ 07:00  --------------------------------------------------------  IN: 8790.4 mL / OUT: 4443 mL / NET: 4347.4 mL          PHYSICAL EXAM:  Constitutional: NAD  HEENT: anicteric sclera, oropharynx clear, MMM  Neck: No JVD  Respiratory: CTAB, no wheezes, rales or rhonchi  Cardiovascular: S1, S2, RRR  Gastrointestinal: BS+, soft, NT/ND  Extremities: No cyanosis or clubbing. No peripheral edema  :  No smart.   Skin: No rashes    LABS:      142  |  97<L>  |  32<H>  ----------------------------<  82  3.7   |  5<LL>  |  1.8<H>    Ca    6.8<L>      16 Mar 2019 02:47  Phos  8.9     -15  Mg     1.8         TPro  4.1<L>  /  Alb  2.4<L>  /  TBili  5.9<H>  /  DBili      /  AST  4071<H>  /  ALT  343<H>  /  AlkPhos  814<H>    Uric Acid, Serum: 6.7 mg/dL (03.15.19 @ 13:40)                            11.0   4.66  )-----------( 14       ( 16 Mar 2019 02:47 )             33.9       Urine Studies:  Urinalysis Basic - ( 13 Mar 2019 19:44 )    Color: Yellow / Appearance: Cloudy / S.015 / pH:   Gluc:  / Ketone: Negative  / Bili: Negative / Urobili: 1.0 mg/dL   Blood:  / Protein: 30 mg/dL / Nitrite: Negative   Leuk Esterase: Negative / RBC: 3-5 /HPF / WBC    Sq Epi:  / Non Sq Epi: Few /HPF / Bacteria: Few /HPF        RADIOLOGY & ADDITIONAL STUDIES: Nephrology progress note    Patient is seen and examined, events over the last 24 h noted .  still on CVVHD   severely acidotic     Allergies:  No Known Allergies    Hospital Medications:   MEDICATIONS  (STANDING):  cefepime   IVPB 2000 milliGRAM(s) IV Intermittent daily  CRRT Treatment    <Continuous>  fidaxomicin 200 milliGRAM(s) Oral two times a day  metroNIDAZOLE  IVPB 500 milliGRAM(s) IV Intermittent every 8 hours  midazolam Infusion 0.02 mG/kG/Hr (0.852 mL/Hr) IV Continuous <Continuous>  mupirocin 2% Ointment 1 Application(s) Topical two times a day  norepinephrine Infusion 0.05 MICROgram(s)/kG/Min (1.997 mL/Hr) IV Continuous <Continuous>  pantoprazole  Injectable 40 milliGRAM(s) IV Push every 12 hours  phenylephrine    Infusion 0.5 MICROgram(s)/kG/Min (3.994 mL/Hr) IV Continuous <Continuous>  PureFlow Dialysate RFP-400 (K 2 / Ca 3) 5000 milliLiter(s) (1500 mL/Hr) CRRT <Continuous>  rasburicase IVPB 8 milliGRAM(s) IV Intermittent daily  sodium bicarbonate  Infusion 0.528 mEq/kG/Hr (150 mL/Hr) IV Continuous <Continuous>  thiamine Injectable 100 milliGRAM(s) IV Push daily  vancomycin    Solution 250 milliGRAM(s) Oral every 6 hours  vasopressin Infusion 0.04 Unit(s)/Min (2.4 mL/Hr) IV Continuous <Continuous>        VITALS:  T(F): 95.5 (19 @ 04:00), Max: 103.8 (03-15-19 @ 12:00)  HR: 123 (19 @ 07:31)  BP: --  RR: 33 (19 @ 06:45)  SpO2: 97% (19 @ 07:31)  Wt(kg): --     @ 07:01  -  03-15 @ 07:00  --------------------------------------------------------  IN: 4127.8 mL / OUT: 1785 mL / NET: 2342.8 mL    03-15 @ 07:01  -   @ 07:00  --------------------------------------------------------  IN: 8790.4 mL / OUT: 4443 mL / NET: 4347.4 mL          PHYSICAL EXAM:  Constitutional: intubated on MV   HEENT: anicteric sclera, oropharynx clear, MMM  Neck: No JVD  Respiratory: Crackles diffuse   Cardiovascular: S1, S2, RRR  Gastrointestinal: BS+, soft, NT/ND  Extremities: No cyanosis or clubbing. No peripheral edema  :  No smart.   Skin: No rashes    LABS:      142  |  97<L>  |  32<H>  ----------------------------<  82  3.7   |  5<LL>  |  1.8<H>    Ca    6.8<L>      16 Mar 2019 02:47  Phos  8.9     -15  Mg     1.8     -    TPro  4.1<L>  /  Alb  2.4<L>  /  TBili  5.9<H>  /  DBili      /  AST  4071<H>  /  ALT  343<H>  /  AlkPhos  814<H>    Uric Acid, Serum: 6.7 mg/dL (03.15.19 @ 13:40)                            11.0   4.66  )-----------( 14       ( 16 Mar 2019 02:47 )             33.9     Clostridium difficile Toxin by PCR: RESULT INTERPRETATION:    Detected - Clostridium difficile toxin B detected by amplified DNA PCR .        Urine Studies:  Urinalysis Basic - ( 13 Mar 2019 19:44 )    Color: Yellow / Appearance: Cloudy / S.015 / pH:   Gluc:  / Ketone: Negative  / Bili: Negative / Urobili: 1.0 mg/dL   Blood:  / Protein: 30 mg/dL / Nitrite: Negative   Leuk Esterase: Negative / RBC: 3-5 /HPF / WBC    Sq Epi:  / Non Sq Epi: Few /HPF / Bacteria: Few /HPF        RADIOLOGY & ADDITIONAL STUDIES:    Blood Gas Profile - Arterial (19 @ 07:43)    pH, Arterial: 7.04: Dr Quintanilla nottified on rounds about all abg results read back 0750    pCO2, Arterial: 22: Dr Quintanilla nottified on rounds about all abg results read back 0750 mmHg    pO2, Arterial: 130: Dr Quintanilla nottified on rounds about all abg results read back 0750 mmHg    HCO3, Arterial: 6: Dr Quintanilla nottified on rounds about all abg results read back 0750 mmoL/L    Base Excess, Arterial: -22.9: Dr Quintanilla nottified on rounds about all abg results read back 0750 mmoL/L    Oxygen Saturation, Arterial: 97: Dr Quintanilla nottified on rounds about all abg results read back 0750 %    FIO2, Arterial: 50: Dr Quintanilla nottified on rounds about all abg results read back 0750    Lactate, Blood: 25.6: TYPE:(C=Critical, N=Notification, A=Abnormal) C  TESTS: _LACTATE CO2  DATE/TIME CALLED: _19 04:04  CALLED TO: _  READ BACK (2 Patient Identifiers)(Y/N): _Y  READ BACK VALUES (Y/N): _Y  CALLED BY: _SI mmol/L (19 @ 02:47)

## 2019-03-16 NOTE — DIETITIAN INITIAL EVALUATION ADULT. - OTHER INFO
Reason for RD assessment: Consult for unintentional wt loss + BMI <18 (due 3/15, see limited note for reason why assessment was delayed). Pertinent Medical Information: p/w Nausea/Vomiting. Septic shock - currently intubated and receiving multiple pressors. Septic shock 2/2 cholecystitis vs. pancolitis from cdif vs. autoimmune? as per progress notes. C.diff noted. Recent dx of lymphoma noted.  ADONIS / Tumor lysis syndrome/ lactic acidosis / respiratory failure / severe thrombocytopenia/ ?DIC - receiving CVVHD at this time.

## 2019-03-16 NOTE — DIETITIAN INITIAL EVALUATION ADULT. - PERTINENT MEDS FT
sodium bicarbonate, levophed at 1.997 mL/hr, protonix, phenylephrine at 3.994 mL/hr, thiamine, vasopressin at 2.4 mL/hr

## 2019-03-16 NOTE — DIETITIAN INITIAL EVALUATION ADULT. - PERTINENT LABORATORY DATA
3/16: WBC-3.03, RBC-3.60, H/H-10.2/30.6, K-3.1, Cl-94, gluc-111, corrected Ca-8.2, Mg-2.0 (WDL); 3/15: PO4-8.9

## 2019-03-16 NOTE — DIETITIAN INITIAL EVALUATION ADULT. - NS AS NUTRI DX NUTRIENT
severe protein-calorie malnutrition - context unverified at this time d/t lack of nutrition history; diagnosis per physical evidence/Malnutrition

## 2019-03-16 NOTE — DIETITIAN INITIAL EVALUATION ADULT. - MD RECOMMEND
Recommendation: RD to monitor plan of care & feasibility for nutrition regimen. At this time, would highly recommend nutrition support consult to evaluate if no change in medical status expected.

## 2019-03-17 VITALS — RESPIRATION RATE: 30 BRPM | OXYGEN SATURATION: 84 %

## 2019-03-17 LAB
ALBUMIN SERPL ELPH-MCNC: 2.1 G/DL — LOW (ref 3.5–5.2)
ALP SERPL-CCNC: 733 U/L — HIGH (ref 30–115)
ALT FLD-CCNC: 596 U/L — HIGH (ref 0–41)
ANION GAP SERPL CALC-SCNC: 42 MMOL/L — HIGH (ref 7–14)
APTT BLD: >200 SEC (ref 27.5–36.3)
APTT BLD: >200 SEC — CRITICAL HIGH (ref 27–39.2)
APTT BLD: >200 SEC — CRITICAL HIGH (ref 27–39.2)
AST SERPL-CCNC: 7 U/L — SIGNIFICANT CHANGE UP (ref 0–41)
BASE EXCESS BLDA CALC-SCNC: -16.4 MMOL/L — LOW (ref -2–2)
BASOPHILS # BLD AUTO: 0.01 K/UL — SIGNIFICANT CHANGE UP (ref 0–0.2)
BASOPHILS NFR BLD AUTO: 0.7 % — SIGNIFICANT CHANGE UP (ref 0–1)
BILIRUB SERPL-MCNC: 6.1 MG/DL — HIGH (ref 0.2–1.2)
BLD GP AB SCN SERPL QL: SIGNIFICANT CHANGE UP
BUN SERPL-MCNC: 15 MG/DL — SIGNIFICANT CHANGE UP (ref 10–20)
CALCIUM SERPL-MCNC: 6.2 MG/DL — LOW (ref 8.5–10.1)
CHLORIDE SERPL-SCNC: 92 MMOL/L — LOW (ref 98–110)
CO2 SERPL-SCNC: 10 MMOL/L — LOW (ref 17–32)
CORTIS AM PEAK SERPL-MCNC: 17.6 UG/DL — SIGNIFICANT CHANGE UP (ref 6–18.4)
CREAT SERPL-MCNC: 1 MG/DL — SIGNIFICANT CHANGE UP (ref 0.7–1.5)
EOSINOPHIL # BLD AUTO: 0.01 K/UL — SIGNIFICANT CHANGE UP (ref 0–0.7)
EOSINOPHIL NFR BLD AUTO: 0.7 % — SIGNIFICANT CHANGE UP (ref 0–8)
GLUCOSE BLDC GLUCOMTR-MCNC: 123 MG/DL — HIGH (ref 70–99)
GLUCOSE BLDC GLUCOMTR-MCNC: 136 MG/DL — HIGH (ref 70–99)
GLUCOSE BLDC GLUCOMTR-MCNC: 158 MG/DL — HIGH (ref 70–99)
GLUCOSE BLDC GLUCOMTR-MCNC: 95 MG/DL — SIGNIFICANT CHANGE UP (ref 70–99)
GLUCOSE SERPL-MCNC: 123 MG/DL — HIGH (ref 70–99)
HCO3 BLDA-SCNC: 10 MMOL/L — LOW (ref 21–29)
HCT VFR BLD CALC: 20.9 % — LOW (ref 37–47)
HCT VFR BLD CALC: 28.5 % — LOW (ref 37–47)
HGB BLD-MCNC: 7 G/DL — CRITICAL LOW (ref 12–16)
HGB BLD-MCNC: 9.2 G/DL — LOW (ref 12–16)
HOROWITZ INDEX BLDA+IHG-RTO: 50 — SIGNIFICANT CHANGE UP
IMM GRANULOCYTES NFR BLD AUTO: 12.5 % — HIGH (ref 0.1–0.3)
INR BLD: 3.72 RATIO — HIGH (ref 0.88–1.16)
INR BLD: SIGNIFICANT CHANGE UP RATIO (ref 0.65–1.3)
INR BLD: SIGNIFICANT CHANGE UP RATIO (ref 0.65–1.3)
LYMPHOCYTES # BLD AUTO: 0.2 K/UL — LOW (ref 1.2–3.4)
LYMPHOCYTES # BLD AUTO: 13.9 % — LOW (ref 20.5–51.1)
MAGNESIUM SERPL-MCNC: 1.5 MG/DL — LOW (ref 1.8–2.4)
MCHC RBC-ENTMCNC: 28 PG — SIGNIFICANT CHANGE UP (ref 27–31)
MCHC RBC-ENTMCNC: 28.3 PG — SIGNIFICANT CHANGE UP (ref 27–31)
MCHC RBC-ENTMCNC: 32.3 G/DL — SIGNIFICANT CHANGE UP (ref 32–37)
MCHC RBC-ENTMCNC: 33.5 G/DL — SIGNIFICANT CHANGE UP (ref 32–37)
MCV RBC AUTO: 84.6 FL — SIGNIFICANT CHANGE UP (ref 81–99)
MCV RBC AUTO: 86.9 FL — SIGNIFICANT CHANGE UP (ref 81–99)
MONOCYTES # BLD AUTO: 0.06 K/UL — LOW (ref 0.1–0.6)
MONOCYTES NFR BLD AUTO: 4.2 % — SIGNIFICANT CHANGE UP (ref 1.7–9.3)
NEUTROPHILS # BLD AUTO: 0.98 K/UL — LOW (ref 1.4–6.5)
NEUTROPHILS NFR BLD AUTO: 68 % — SIGNIFICANT CHANGE UP (ref 42.2–75.2)
NRBC # BLD: 12 /100 WBCS — HIGH (ref 0–0)
NRBC # BLD: 9 /100 WBCS — HIGH (ref 0–0)
PCO2 BLDA: 28 MMHG — LOW (ref 38–42)
PH BLDA: 7.18 — CRITICAL LOW (ref 7.38–7.42)
PHOSPHATE SERPL-MCNC: 3.5 MG/DL — SIGNIFICANT CHANGE UP (ref 2.1–4.9)
PLATELET # BLD AUTO: 25 K/UL — LOW (ref 130–400)
PLATELET # BLD AUTO: 60 K/UL — LOW (ref 130–400)
PO2 BLDA: 93 MMHG — SIGNIFICANT CHANGE UP (ref 78–95)
POTASSIUM SERPL-MCNC: 2.8 MMOL/L — LOW (ref 3.5–5)
POTASSIUM SERPL-SCNC: 2.8 MMOL/L — LOW (ref 3.5–5)
PROT SERPL-MCNC: 3.5 G/DL — LOW (ref 6–8)
PROTHROM AB SERPL-ACNC: 44.5 SEC — HIGH (ref 10–13.1)
PROTHROM AB SERPL-ACNC: SIGNIFICANT CHANGE UP (ref 9.95–12.87)
PROTHROM AB SERPL-ACNC: SIGNIFICANT CHANGE UP (ref 9.95–12.87)
RBC # BLD: 2.47 M/UL — LOW (ref 4.2–5.4)
RBC # BLD: 3.28 M/UL — LOW (ref 4.2–5.4)
RBC # FLD: 17.4 % — HIGH (ref 11.5–14.5)
RBC # FLD: 17.4 % — HIGH (ref 11.5–14.5)
SAO2 % BLDA: 96 % — SIGNIFICANT CHANGE UP (ref 92–96)
SODIUM SERPL-SCNC: 144 MMOL/L — SIGNIFICANT CHANGE UP (ref 135–146)
TYPE + AB SCN PNL BLD: SIGNIFICANT CHANGE UP
WBC # BLD: 1.44 K/UL — LOW (ref 4.8–10.8)
WBC # BLD: 2.02 K/UL — LOW (ref 4.8–10.8)
WBC # FLD AUTO: 1.44 K/UL — LOW (ref 4.8–10.8)
WBC # FLD AUTO: 2.02 K/UL — LOW (ref 4.8–10.8)

## 2019-03-17 RX ORDER — SODIUM BICARBONATE 1 MEQ/ML
100 SYRINGE (ML) INTRAVENOUS
Qty: 0 | Refills: 0 | Status: DISCONTINUED | OUTPATIENT
Start: 2019-03-17 | End: 2019-03-17

## 2019-03-17 RX ORDER — POTASSIUM CHLORIDE 20 MEQ
20 PACKET (EA) ORAL
Qty: 0 | Refills: 0 | Status: COMPLETED | OUTPATIENT
Start: 2019-03-17 | End: 2019-03-17

## 2019-03-17 RX ORDER — MORPHINE SULFATE 50 MG/1
4 CAPSULE, EXTENDED RELEASE ORAL ONCE
Qty: 0 | Refills: 0 | Status: DISCONTINUED | OUTPATIENT
Start: 2019-03-17 | End: 2019-03-17

## 2019-03-17 RX ORDER — MORPHINE SULFATE 50 MG/1
4 CAPSULE, EXTENDED RELEASE ORAL
Qty: 100 | Refills: 0 | Status: DISCONTINUED | OUTPATIENT
Start: 2019-03-17 | End: 2019-03-17

## 2019-03-17 RX ORDER — MAGNESIUM SULFATE 500 MG/ML
2 VIAL (ML) INJECTION ONCE
Qty: 0 | Refills: 0 | Status: COMPLETED | OUTPATIENT
Start: 2019-03-17 | End: 2019-03-17

## 2019-03-17 RX ORDER — CEFEPIME 1 G/1
1000 INJECTION, POWDER, FOR SOLUTION INTRAMUSCULAR; INTRAVENOUS EVERY 24 HOURS
Qty: 0 | Refills: 0 | Status: DISCONTINUED | OUTPATIENT
Start: 2019-03-17 | End: 2019-03-17

## 2019-03-17 RX ORDER — POTASSIUM CHLORIDE 20 MEQ
20 PACKET (EA) ORAL
Qty: 0 | Refills: 0 | Status: DISCONTINUED | OUTPATIENT
Start: 2019-03-17 | End: 2019-03-17

## 2019-03-17 RX ADMIN — MORPHINE SULFATE 4 MILLIGRAM(S): 50 CAPSULE, EXTENDED RELEASE ORAL at 20:30

## 2019-03-17 RX ADMIN — PANTOPRAZOLE SODIUM 40 MILLIGRAM(S): 20 TABLET, DELAYED RELEASE ORAL at 06:16

## 2019-03-17 RX ADMIN — FIDAXOMICIN 200 MILLIGRAM(S): 200 GRANULE, FOR SUSPENSION ORAL at 06:19

## 2019-03-17 RX ADMIN — CHLORHEXIDINE GLUCONATE 15 MILLILITER(S): 213 SOLUTION TOPICAL at 18:04

## 2019-03-17 RX ADMIN — Medication 100 MILLIEQUIVALENT(S): at 18:02

## 2019-03-17 RX ADMIN — Medication 100 MILLIGRAM(S): at 00:21

## 2019-03-17 RX ADMIN — Medication 100 MILLIEQUIVALENT(S): at 18:45

## 2019-03-17 RX ADMIN — Medication 250 MILLIGRAM(S): at 14:18

## 2019-03-17 RX ADMIN — PANTOPRAZOLE SODIUM 40 MILLIGRAM(S): 20 TABLET, DELAYED RELEASE ORAL at 00:19

## 2019-03-17 RX ADMIN — CHLORHEXIDINE GLUCONATE 1 APPLICATION(S): 213 SOLUTION TOPICAL at 06:17

## 2019-03-17 RX ADMIN — Medication 100 MILLIEQUIVALENT(S): at 16:35

## 2019-03-17 RX ADMIN — Medication 100 MILLIGRAM(S): at 14:18

## 2019-03-17 RX ADMIN — PANTOPRAZOLE SODIUM 40 MILLIGRAM(S): 20 TABLET, DELAYED RELEASE ORAL at 18:05

## 2019-03-17 RX ADMIN — CEFEPIME 100 MILLIGRAM(S): 1 INJECTION, POWDER, FOR SOLUTION INTRAMUSCULAR; INTRAVENOUS at 11:58

## 2019-03-17 RX ADMIN — Medication 100 MILLIGRAM(S): at 16:34

## 2019-03-17 RX ADMIN — Medication 50 GRAM(S): at 11:57

## 2019-03-17 RX ADMIN — MUPIROCIN 1 APPLICATION(S): 20 OINTMENT TOPICAL at 06:19

## 2019-03-17 RX ADMIN — Medication 50 MILLIEQUIVALENT(S): at 09:47

## 2019-03-17 RX ADMIN — RASBURICASE 110.66 MILLIGRAM(S): KIT at 16:34

## 2019-03-17 RX ADMIN — Medication 100 MILLIEQUIVALENT(S): at 19:30

## 2019-03-17 RX ADMIN — Medication 250 MILLIGRAM(S): at 00:22

## 2019-03-17 RX ADMIN — Medication 50 MILLIEQUIVALENT(S): at 10:45

## 2019-03-17 RX ADMIN — Medication 250 MILLIGRAM(S): at 06:16

## 2019-03-17 RX ADMIN — MORPHINE SULFATE 4 MILLIGRAM(S): 50 CAPSULE, EXTENDED RELEASE ORAL at 21:50

## 2019-03-17 RX ADMIN — Medication 100 MILLIGRAM(S): at 06:16

## 2019-03-17 RX ADMIN — Medication 250 MILLIGRAM(S): at 18:47

## 2019-03-17 RX ADMIN — Medication 100 MILLIEQUIVALENT(S): at 15:29

## 2019-03-17 RX ADMIN — Medication 100 MILLIEQUIVALENT(S): at 00:21

## 2019-03-17 RX ADMIN — CHLORHEXIDINE GLUCONATE 15 MILLILITER(S): 213 SOLUTION TOPICAL at 06:16

## 2019-03-17 RX ADMIN — Medication 50 MILLIEQUIVALENT(S): at 08:01

## 2019-03-17 RX ADMIN — Medication 50 MILLIEQUIVALENT(S): at 14:16

## 2019-03-17 NOTE — PROVIDER CONTACT NOTE (OTHER) - BACKGROUND
Pt on Vasopressin, Norepinephrine, Epinephrine and Phenylephrine drips infusing at maximum doses of each medication. CVVHD continues as ordered. Pt supported on mechanical ventilation.

## 2019-03-17 NOTE — PROGRESS NOTE ADULT - SUBJECTIVE AND OBJECTIVE BOX
Chief Complaint:  Patient is a 63y old  Female who presents with a chief complaint of septic shock (17 Mar 2019 17:08)      Interval Events:   Overnight patient started to have bleeding from multiple sites with some blood tinged from NG tube suction as well as dark black stool. Patient is in DIC.    Allergies:  No Known Allergies    Hospital Medications:  cefepime   IVPB 1000 milliGRAM(s) IV Intermittent every 24 hours  chlorhexidine 0.12% Liquid 15 milliLiter(s) Oral Mucosa two times a day  chlorhexidine 4% Liquid 1 Application(s) Topical <User Schedule>  fidaxomicin 200 milliGRAM(s) Oral two times a day  metroNIDAZOLE  IVPB 500 milliGRAM(s) IV Intermittent every 8 hours  midazolam Infusion 0.02 mG/kG/Hr IV Continuous <Continuous>  morphine  - Injectable 4 milliGRAM(s) IV Push once  morphine  Infusion 4 mG/Hr IV Continuous <Continuous>  mupirocin 2% Ointment 1 Application(s) Topical two times a day  norepinephrine Infusion 0.05 MICROgram(s)/kG/Min IV Continuous <Continuous>  phenylephrine    Infusion 0.5 MICROgram(s)/kG/Min IV Continuous <Continuous>  vancomycin    Solution 250 milliGRAM(s) Oral every 6 hours  vasopressin Infusion 0.04 Unit(s)/Min IV Continuous <Continuous>      PMHX/PSHX:  Hypertension, unspecified type  Lymphoma, unspecified body region, unspecified lymphoma type  Weakness  S/P     ROS:   Unable to obtain , patient is intubated.      PHYSICAL EXAM:   Vital Signs:  Vital Signs Last 24 Hrs  T(C): 36.2 (17 Mar 2019 16:00), Max: 36.2 (17 Mar 2019 00:00)  T(F): 97.1 (17 Mar 2019 16:00), Max: 97.2 (17 Mar 2019 00:00)  HR: 120 (17 Mar 2019 18:30) (120 - 144)  BP: --  BP(mean): --  RR: 25 (17 Mar 2019 18:30) (25 - 38)  SpO2: 100% (17 Mar 2019 18:30) (53% - 100%)  Daily     Daily     GENERAL:  Appears stated age, well-groomed, well-nourished, no distress  HEENT:  NC/AT,  conjunctivae clear and pink, no thyromegaly, nodules, adenopathy, no JVD, sclera + icterus  CHEST:  Full & symmetric excursion, no increased effort, breath sounds clear  HEART:  Regular rhythm, S1, S2, no murmur/rub/S3/S4, no abdominal bruit, no edema  ABDOMEN:  mildly distended, tense,  normoactive bowel sounds,  no masses ,no hepato-splenomegaly,   SKIN:  No rash/erythema/ecchymoses/petechiae/wounds/abscess/warm/dry, Jaundice  NEURO:  Patient is intubated.     LABS:                        7.0    1.44  )-----------( 25       ( 17 Mar 2019 14:59 )             20.9     03-17    144  |  92<L>  |  15  ----------------------------<  123<H>  2.8<L>   |  10<L>  |  1.0    Ca    6.2<L>      17 Mar 2019 04:50  Phos  3.5     03-17  Mg     1.5         TPro  3.5<L>  /  Alb  2.1<L>  /  TBili  6.1<H>  /  DBili  x   /  AST  7   /  ALT  596<H>  /  AlkPhos  733<H>  03-17    LIVER FUNCTIONS - ( 17 Mar 2019 04:50 )  Alb: 2.1 g/dL / Pro: 3.5 g/dL / ALK PHOS: 733 U/L / ALT: 596 U/L / AST: 7 U/L / GGT: x           PT/INR - ( 17 Mar 2019 04:50 )   PT: 44.50 sec;   INR: 3.72 ratio         PTT - ( 17 Mar 2019 04:50 )  PTT:>200.0 sec        Imaging:

## 2019-03-17 NOTE — PROGRESS NOTE ADULT - ASSESSMENT
IMPRESSION:    ARF  Sepsis septic shock  Cholecystitis SP Perc lisbeth  CDiff   Recent DX of Lymphoma  Lactic acidosis   MOF    PLAN:    CNS: No depressants.  FU MS>      HEENT: Oral care    PULMONARY:  HOB @ 45 degrees.  No vent changes X FiO2 40 %    CARDIOVASCULAR: Wean Bradley ABG Q4.  NaHCo3 250     GI: GI prophylaxis.  NPO.  FU with surgery.  Protonix BID     RENAL:  Follow up lytes.  Correct as needed.  Agree with CVVHD.  I=O    INFECTIOUS DISEASE: Follow up cultures.  Cefipime and Flogyl.  Vanc 250 Q6    HEMATOLOGICAL:  DVT prophylaxis.  FU CBC and coags     ENDOCRINE:  Follow up FS.  Insulin protocol if needed.    MUSCULOSKELETAL:  OOB to chair     Keep Urrutia for Bladder pressure    Prognosis poor IMPRESSION:    ARF  Sepsis / septic shock  Cholecystitis SP Perc lisbeth  CDiff   Recent DX of Lymphoma  Lactic acidosis   MOF    PLAN:    CNS: No depressants.  FU MS>      HEENT: Oral care    PULMONARY:  HOB @ 45 degrees.  No vent changes     CARDIOVASCULAR: Wean Bradley ABG Q4.  NaHCo3 250     GI: GI prophylaxis.  NPO.  FU with surgery.  Protonix BID     RENAL:  Follow up lytes.  Correct as needed.  Agree with CVVHD.  I=O    INFECTIOUS DISEASE: Follow up cultures.  Cefipime and Flogyl.  Vanc 250 Q6    HEMATOLOGICAL:  DVT prophylaxis.  FU CBC and coags     ENDOCRINE:  Follow up FS.  Insulin protocol if needed.    MUSCULOSKELETAL:  Bed rest      Keep Urrutia for Bladder pressure    Prognosis poor

## 2019-03-17 NOTE — PROGRESS NOTE ADULT - SUBJECTIVE AND OBJECTIVE BOX
infectious diseases progress note:  SALONI COOPER is a 63yFemale patient    SEVERE SEPSIS ADONIS ACUTE KIDNEY INJURY UREMIA HYPOGLYCEMIA SEVERE DEHYDRATI        ROS negative except as noted in note    Allergies    No Known Allergies    Intolerances        ANTIBIOTICS/RELEVANT:  antimicrobials  cefepime   IVPB 2000 milliGRAM(s) IV Intermittent daily  fidaxomicin 200 milliGRAM(s) Oral two times a day  metroNIDAZOLE  IVPB 500 milliGRAM(s) IV Intermittent every 8 hours  vancomycin    Solution 250 milliGRAM(s) Oral every 6 hours    immunologic:    OTHER:  chlorhexidine 0.12% Liquid 15 milliLiter(s) Oral Mucosa two times a day  chlorhexidine 4% Liquid 1 Application(s) Topical <User Schedule>  CRRT Treatment    <Continuous>  EPINEPHrine    Infusion 0.1 MICROgram(s)/kG/Min IV Continuous <Continuous>  midazolam Infusion 0.02 mG/kG/Hr IV Continuous <Continuous>  mupirocin 2% Ointment 1 Application(s) Topical two times a day  norepinephrine Infusion 0.05 MICROgram(s)/kG/Min IV Continuous <Continuous>  pantoprazole  Injectable 40 milliGRAM(s) IV Push every 12 hours  pantoprazole Infusion 8 mG/Hr IV Continuous <Continuous>  phenylephrine    Infusion 0.5 MICROgram(s)/kG/Min IV Continuous <Continuous>  PureFlow Dialysate RFP-400 (K 2 / Ca 3) 5000 milliLiter(s) CRRT <Continuous>  rasburicase IVPB 8 milliGRAM(s) IV Intermittent daily  sodium bicarbonate  Infusion 0.88 mEq/kG/Hr IV Continuous <Continuous>  thiamine Injectable 100 milliGRAM(s) IV Push daily  vasopressin Infusion 0.04 Unit(s)/Min IV Continuous <Continuous>      Objective:  T(F): 96.9 (03-17-19 @ 12:00), Max: 97.2 (03-17-19 @ 00:00)  HR: 126 (03-17-19 @ 12:30) (122 - 144)  BP: --  RR: 27 (03-17-19 @ 12:30) (25 - 38)  SpO2: 71% (03-17-19 @ 10:30) (53% - 98%)    PHYSICAL EXAM  Heent: Normocephalic, atraumatic	  Neck: no lymphadenopathy, supple  Respiratory: CTA sameer  Cardiovascular: S1S2 RRR, no murmurs  Gastrointestinal:soft, (+) BS, no HSM  Extremities:no e/e/c    03-17    144  |  92<L>  |  15  ----------------------------<  123<H>  2.8<L>   |  10<L>  |  1.0    Mg     1.5     03-17    TPro  3.5<L>  /  Alb  2.1<L>  /  TBili  6.1<H>  /  DBili  x   /  AST  7   /  ALT  596<H>  /  AlkPhos  733<H>  03-17    <--<9>>7.16  <--<9>>7.15  <--<9>>7.18  <146<9>>7.17  <--<9>>7.48  <--<9>>7.26  <--<9>>7.20  <137<9>>7.04  <140<9>>7.09  <138<9>>7.06  <138<9>>6.98                          9.2    2.02  )-----------( 60       ( 17 Mar 2019 04:50 )             28.5       PT/INR - ( 17 Mar 2019 04:50 )   PT: 44.50 sec;   INR: 3.72 ratio         PTT - ( 16 Mar 2019 08:25 )  PTT:>200.0 sec    Culture - Blood (collected 15 Mar 2019 04:39)  Source: .Blood None  Preliminary Report (16 Mar 2019 16:01):    No growth to date.    Culture - Body Fluid with Gram Stain (collected 14 Mar 2019 19:18)  Source: .Body Fluid None  Gram Stain (15 Mar 2019 06:44):    No polymorphonuclear cells seen    No organisms seen    by cytocentrifuge  Preliminary Report (16 Mar 2019 09:54):    No growth    Culture - Blood (collected 14 Mar 2019 04:11)  Source: .Blood None  Preliminary Report (15 Mar 2019 14:00):    No growth to date.    Culture - Blood (collected 13 Mar 2019 15:10)  Source: .Blood Blood  Preliminary Report (15 Mar 2019 14:00):    No growth to date.    Culture - Blood (collected 13 Mar 2019 15:10)  Source: .Blood Blood  Preliminary Report (14 Mar 2019 23:00):    No growth to date.

## 2019-03-17 NOTE — PROGRESS NOTE ADULT - SUBJECTIVE AND OBJECTIVE BOX
SUBJECTIVE:    Patient is a 63y old Female who presents with a chief complaint of septic shock (17 Mar 2019 12:51)      HPI:  64 y/o F with PMHx lymphoma (diagnosed 1 week ago via neck mass, not on chemo), HTN, "heart failure" that resulted in fluid overload, presenting for nausea and vomiting. After finding out about her diagnosis, she has not been eating a drinking and eating for the past 1 wk, but has been taking lasix 40mg daily. She woke up this morning feeling, nauseous, weak, and this then resulted in the pt going to . There she was hypotensive and sent to the ER. She was hypotensive, febrile in the ED. She received 5L bolus, was started on levo and vasopressin, a R IJ central line and A line was placed bc her bp on the cuff was labile. She had CT abd and RUQ sono which showed concern for cholecystitis. Surgery evaluated pt and was not impressed with clinical picture. GI evaluated pt at bedside and stated she is low risk for chloledochelithiasis and recommended percutaneous lisbeth if the labwork worsens. Pt has no hx of gallbladder dx,  no abd sx besides . (13 Mar 2019 20:45)      Currently admitted to medicine with the primary diagnosis of Severe sepsis     on 4 pressors, retracts to pain    Besides the pertinent positives and negatives described above, the ROS was within normal limits.    PAST MEDICAL & SURGICAL HISTORY  Hypertension, unspecified type  Lymphoma, unspecified body region, unspecified lymphoma type  Weakness  S/P     SOCIAL HISTORY:    ALLERGIES:  No Known Allergies    MEDICATIONS:  STANDING MEDICATIONS  cefepime   IVPB 1000 milliGRAM(s) IV Intermittent every 24 hours  chlorhexidine 0.12% Liquid 15 milliLiter(s) Oral Mucosa two times a day  chlorhexidine 4% Liquid 1 Application(s) Topical <User Schedule>  EPINEPHrine    Infusion 0.1 MICROgram(s)/kG/Min IV Continuous <Continuous>  fidaxomicin 200 milliGRAM(s) Oral two times a day  metroNIDAZOLE  IVPB 500 milliGRAM(s) IV Intermittent every 8 hours  midazolam Infusion 0.02 mG/kG/Hr IV Continuous <Continuous>  mupirocin 2% Ointment 1 Application(s) Topical two times a day  norepinephrine Infusion 0.05 MICROgram(s)/kG/Min IV Continuous <Continuous>  pantoprazole  Injectable 40 milliGRAM(s) IV Push every 12 hours  pantoprazole Infusion 8 mG/Hr IV Continuous <Continuous>  phenylephrine    Infusion 0.5 MICROgram(s)/kG/Min IV Continuous <Continuous>  rasburicase IVPB 8 milliGRAM(s) IV Intermittent daily  sodium bicarbonate  Infusion 0.88 mEq/kG/Hr IV Continuous <Continuous>  sodium bicarbonate  Injectable 100 milliEquivalent(s) IV Push every 1 hour  thiamine Injectable 100 milliGRAM(s) IV Push daily  vancomycin    Solution 250 milliGRAM(s) Oral every 6 hours  vasopressin Infusion 0.04 Unit(s)/Min IV Continuous <Continuous>    PRN MEDICATIONS    VITALS:   T(F): 97.1  HR: 132  BP: --  RR: 25  SpO2: 90%    LABS:                        7.0    1.44  )-----------( 25       ( 17 Mar 2019 14:59 )             20.9     03-17    144  |  92<L>  |  15  ----------------------------<  123<H>  2.8<L>   |  10<L>  |  1.0    Ca    6.2<L>      17 Mar 2019 04:50  Phos  3.5     03-  Mg     1.5     17    TPro  3.5<L>  /  Alb  2.1<L>  /  TBili  6.1<H>  /  DBili  x   /  AST  7   /  ALT  596<H>  /  AlkPhos  733<H>  03-17    PT/INR - ( 17 Mar 2019 04:50 )   PT: 44.50 sec;   INR: 3.72 ratio         PTT - ( 17 Mar 2019 04:50 )  PTT:>200.0 sec    ABG - ( 17 Mar 2019 16:05 )  pH, Arterial: 7.29  pH, Blood: x     /  pCO2: 34    /  pO2: 62    / HCO3: 16    / Base Excess: -9.4  /  SaO2: 92                    Culture - Blood (collected 15 Mar 2019 04:39)  Source: .Blood None  Preliminary Report (16 Mar 2019 16:01):    No growth to date.    Culture - Body Fluid with Gram Stain (collected 14 Mar 2019 19:18)  Source: .Body Fluid None  Gram Stain (15 Mar 2019 06:44):    No polymorphonuclear cells seen    No organisms seen    by cytocentrifuge  Preliminary Report (16 Mar 2019 09:54):    No growth          RADIOLOGY:    PHYSICAL EXAM:  GEN: No acute distress  LUNGS: Clear to auscultation bilaterally   HEART: Regular  ABD: rigid, non distended  EXT: noncyanotic/NE/2+PP/VANCE/Skin Intact.   NEURO: intubated    Intravenous access: yes  NG tube: no  Urrutia Catheter:   Indwelling Urethral Catheter:     Connect To:  Straight Drainage/[place]    Indication:  Urinary Retention / Obstruction (19 @ 09:47) (not performed) [active]  Indwelling Urethral Catheter:     Connect To:  Leg Bag    Indication:  Urine Output Monitoring in Critically Ill (19 @ 12:53) (not performed) [active]  Indwelling Urethral Catheter:     Connect To:  Straight Drainage/Gravity    Indication:  Urine Output Monitoring in Critically Ill (03-15-19 @ 16:21) (not performed) [active]  Indwelling Urethral Catheter:     Connect To:  Straight Drainage/Gravity    Indication:  Urine Output Monitoring in Critically Ill (19 @ 12:57) (not performed) [active]

## 2019-03-17 NOTE — PROVIDER CONTACT NOTE (OTHER) - ACTION/TREATMENT ORDERED:
notified
notified
Morphine 4mg IVP x 1 ordered and administered. Dr. Dennis spoke with Palliative team, And  Anette Toure  Nurse on Duty aware about plan of care.

## 2019-03-17 NOTE — PROGRESS NOTE ADULT - REASON FOR ADMISSION
septic shock

## 2019-03-17 NOTE — PROVIDER CONTACT NOTE (OTHER) - ASSESSMENT
patient blood map 65 and improved to 73 after d50 was given.
patient requiring more levophed to more then 60cc over when Ultra filtration increased to 350
Hemodynamically unstable.

## 2019-03-17 NOTE — PROGRESS NOTE ADULT - SUBJECTIVE AND OBJECTIVE BOX
Nephrology progress note    Patient is seen and examined, events over the last 24 h noted.  Intubated, on vent.    Allergies:  No Known Allergies    Hospital Medications:   MEDICATIONS  (STANDING):  cefepime   IVPB 2000 milliGRAM(s) IV Intermittent daily  chlorhexidine 0.12% Liquid 15 milliLiter(s) Oral Mucosa two times a day  chlorhexidine 4% Liquid 1 Application(s) Topical <User Schedule>  CRRT Treatment    <Continuous>  EPINEPHrine    Infusion 0.1 MICROgram(s)/kG/Min (15.975 mL/Hr) IV Continuous <Continuous>  fidaxomicin 200 milliGRAM(s) Oral two times a day  metroNIDAZOLE  IVPB 500 milliGRAM(s) IV Intermittent every 8 hours  midazolam Infusion 0.02 mG/kG/Hr (0.852 mL/Hr) IV Continuous <Continuous>  mupirocin 2% Ointment 1 Application(s) Topical two times a day  norepinephrine Infusion 0.05 MICROgram(s)/kG/Min (1.997 mL/Hr) IV Continuous <Continuous>  pantoprazole  Injectable 40 milliGRAM(s) IV Push every 12 hours  pantoprazole Infusion 8 mG/Hr (10 mL/Hr) IV Continuous <Continuous>  phenylephrine    Infusion 0.5 MICROgram(s)/kG/Min (3.994 mL/Hr) IV Continuous <Continuous>  PureFlow Dialysate RFP-400 (K 2 / Ca 3) 5000 milliLiter(s) (2000 mL/Hr) CRRT <Continuous>  rasburicase IVPB 8 milliGRAM(s) IV Intermittent daily  sodium bicarbonate  Infusion 0.88 mEq/kG/Hr (250 mL/Hr) IV Continuous <Continuous>  thiamine Injectable 100 milliGRAM(s) IV Push daily  vancomycin    Solution 250 milliGRAM(s) Oral every 6 hours  vasopressin Infusion 0.04 Unit(s)/Min (2.4 mL/Hr) IV Continuous <Continuous>        VITALS:  T(F): 95 (19 @ 08:00), Max: 97.2 (19 @ 00:00)  HR: 130 (19 @ 11:45)  BP: 84/64  RR: 29 (19 @ 11:45)  SpO2: 71% (19 @ 10:30)      03-15 @ 07:01  -   @ 07:00  --------------------------------------------------------  IN: 8790.4 mL / OUT: 4443 mL / NET: 4347.4 mL     @ 07: @ 07:00  --------------------------------------------------------  IN: 26572 mL / OUT: 7258 mL / NET: 7683 mL     @ 07: @ 12:12  --------------------------------------------------------  IN: 2748.8 mL / OUT: 795 mL / NET: 1953.8 mL          PHYSICAL EXAM:  Constitutional: intbated on vent. hypothermic on heating blanket.  Respiratory: on MV, b/l rhonchi  Cardiovascular: S1, S2, RRR  Gastrointestinal: BS+, soft, NT/ND  Extremities: No peripheral edema  :  + smart.       LABS:      144  |  92<L>  |  15  ----------------------------<  123<H>  2.8<L>   |  10<L>  |  1.0    Ca    6.2<L>      17 Mar 2019 04:50  Phos  3.5       Mg     1.5         TPro  3.5<L>  /  Alb  2.1<L>  /  TBili  6.1<H>  /  DBili      /  AST  7   /  ALT  596<H>  /  AlkPhos  733<H>                            9.2    2.02  )-----------( 60       ( 17 Mar 2019 04:50 )             28.5     Blood Gas Arterial, Lactate: 24.0: Dr Quintanilla notified in person about all abg results read back 0800 mmoL/L (19 @ 07:31)    Blood Gas Profile - Arterial (19 @ 07:31)    pH, Arterial: 7.15: Dr Quintanilla notified in person about all abg results read back 0800    pCO2, Arterial: 31: Dr Quintanilla notified in person about all abg results read back 0800 mmHg    pO2, Arterial: 73: Dr Quintanilla notified in person about all abg results read back 0800 mmHg    HCO3, Arterial: 11: Dr Quintanilla notified in person about all abg results read back 0800 mmoL/L    Base Excess, Arterial: -16.8: Dr Quintanilla notified in person about all abg results read back 0800 mmoL/L    Oxygen Saturation, Arterial: 93: Dr Quintanilla notified in person about all abg results read back 0800 %    FIO2, Arterial: 50: Dr Quintanilla notified in person about all abg results read back 0800      Urine Studies:  Urinalysis Basic - ( 13 Mar 2019 19:44 )    Color: Yellow / Appearance: Cloudy / S.015 / pH:   Gluc:  / Ketone: Negative  / Bili: Negative / Urobili: 1.0 mg/dL   Blood:  / Protein: 30 mg/dL / Nitrite: Negative   Leuk Esterase: Negative / RBC: 3-5 /HPF / WBC    Sq Epi:  / Non Sq Epi: Few /HPF / Bacteria: Few /HPF        RADIOLOGY & ADDITIONAL STUDIES:  < from: Xray Chest 1 View- PORTABLE-Routine (19 @ 04:47) >  Impression:      Stable retrocardiac opacity. Mild interstitial markings. No effusion or   pneumothorax.    Support lines and tubes as described.    < end of copied text >

## 2019-03-17 NOTE — PHARMACOTHERAPY INTERVENTION NOTE - COMMENTS
frequency of sodium bicarbonate injections q1h was questioned. Dr Brower claims it recommended by Dr Schultz
medical rounds:  -vancomycin 750mg IV [id number]-[id number] 2.4--recommended adjusting to 750mg IV g60w--s/c vanco  -meropenem 1g IV q12h--recommended 500mg IV [id number]--k/c meropenem  -start Cefepime -recommended 2g IV q24h
s/w md to change bactroban to regular oint. nasal not available

## 2019-03-17 NOTE — PROGRESS NOTE ADULT - ASSESSMENT
Patient with ADONIS presented to hospital for nausea and vomiting.  PMH lymphoma (diagnosed 1 week ago via neck mass, not on chemo), HTN, "heart failure"    ·	 ADONIS / Tumor lysis syndrome/ lactic acidosis / respiratory failure / severe thrombocytopenia/ ?DIC   ·	on 4 pressors, still BP on lower side.  ·	sp rasbirucase   ·	on bicarb drip continue   ·	oligoanuric  ·	continue CVVHD keep in even balance   ·	C diff colitis on vanco po   ·	DC rasbirucase   ·	on cefepime and flagyl and vanco po fidaxomycin  / adjust doses to CVVHD   ·	hypocalcemia  and hypomagnesemia: corrected Ca 7.8 noted. Replenish Mg.    prognosis very poor

## 2019-03-17 NOTE — PROGRESS NOTE ADULT - ATTENDING COMMENTS
Patient seen and examined 3/14/19
Patient seen and examined 3/15/19
Seen with. hemodynamically unstable. Tumor lysis syndrome. on CVVH. Treat Mg deficit. Poor prgnosis.
no surgical intervention   recall as needed
intubated and sedated   still on pressors   Gb drain in place   for CVVH today   continue care as per medicine  Cdiff colitis e

## 2019-03-17 NOTE — PROGRESS NOTE ADULT - ASSESSMENT
62 y/o female w/ PMHx of recently diagnosed lymphoma ( not on ttt), HTN, CHF was sent in from an urgent care for further evaluation for hypotension. Patient was admitted to ICU for sepsis and GI was consulted for concern of GI source. CT imaging shows Pan Colitis and patient tested positive for C-Diff. Imaging also suggestive of Cholecystitis without obstructive pattern s/p percutaneous cholecystostomy. hospital course complicated by multiorgan failure and DIC.  Patient remains in ICU, requiring multiple pressor support.   - Rule out GI bleed:  - Septic shock/ Cholecystitis  - C diff colitis  - DIC   - multiorgan failure  Plan:  continue ICU monitoring   Broad spectrum IV Abx  monitor H&H, transfuse appropriately  IV PPI  PO vanco  monitor LFTs  patient is high risk for any endoscopic intervention given that she is in DIC  poor prognosis

## 2019-03-17 NOTE — PROGRESS NOTE ADULT - ASSESSMENT
63 yoF with pmhx of lymphoma diagnosed 1 week ago, and chf with unspecified EF presented in septic shock 2/2 likely abdominal process as of yet unspecified    GASTROINTESTINAL  # Septic shock 2/2 cholecystitis vs. pancolitis from cdif vs. autoimmune?  - ID following: Continue fidaxomicin 200mg BID PO; PO vanc 250 q6h, (can also give rectally); IV flagyl 500 q8h; Continue cefepime 1g q24h  - F/u stool studies  - having dark bloody bowel movements  - exquisite RUQ tenderness and surgical abdomen / abd ultrasound shows pericholecystic fluid and positive monroy's sign consistent with acute cholecystitis / prelim read of CT a/p shows pancolitis no perforation  - perc lisbeth done by IR 3/14/19, sludge like fluid no clear infection    HEMATOLOGY  # Likely DIC 2/2 septic shock 2/2 inflammatory process  -getting 2 units prbc today, 4 units ffp, initially ordered 2 units of platelets for platelet count of 25 / Dr. Pompa released 1 unit  - seen by hem/onc, unlikely to be tumor lysis because of no chemo hx, f/u cmp, phosphorus, ldh, uric acid, if improved then no rasburicase    RENAL  # ADONIS likely prerenal 2/2 septic shock  - unknown baseline, Cr on admission was 3.1. Cr is improving  - on CVVHD  - nephro following    NEUROLOGICAL  - intubated, withdrawing to pain    RESPIRATORY  - intubated    CARDIOVASCULAR  # EF 40% grade 1 diastolic dysfunction  - LE doppler negative    FEEDING/ELECTROLYTES/NUTRITION  - not getting TPN, will monitor after CVVHD  - sodium/potassium wnl    PLAN: Monitor for improvement in lactic acidosis and bicarb during cvvhd, F/u ABG (from a line), f/u fluid cultures gallbladder, monitor for signs of increasing sepsis    DVT PPX on scds  GI PPX protonix BID  FULL CODE  VERY POOR PROGNOSIS. Family aware.

## 2019-03-17 NOTE — PROVIDER CONTACT NOTE (OTHER) - SITUATION
Pt on Vasopressin, Norepinephrine, Epinephrine and Phenylephrine drips infusing at maximum doses of each medication. Daughter notified regarding pt status and prognosis.

## 2019-03-17 NOTE — PROGRESS NOTE ADULT - SUBJECTIVE AND OBJECTIVE BOX
Patient is a 63y old  Female who presents with a chief complaint of septic shock (16 Mar 2019 09:02)        Over Night Events: On MV.  ON CVVHD        ROS:  See HPI    PHYSICAL EXAM    ICU Vital Signs Last 24 Hrs  T(C): 34.4 (17 Mar 2019 04:00), Max: 36.2 (17 Mar 2019 00:00)  T(F): 93.9 (17 Mar 2019 04:00), Max: 97.2 (17 Mar 2019 00:00)  HR: 126 (17 Mar 2019 07:30) (120 - 144)  BP: --  BP(mean): --  ABP: 90/60 (17 Mar 2019 07:30) (68/52 - 140/76)  ABP(mean): 70 (17 Mar 2019 07:30) (56 - 94)  RR: 33 (17 Mar 2019 07:30) (25 - 38)  SpO2: 53% (17 Mar 2019 06:45) (53% - 98%)      General:  HEENT: + ET            Lymphatic system: No cervical LN   Lungs: Bilateral BS  Cardiovascular: Regular   Gastrointestinal: Soft, Positive BS  Extremities: No clubbing.  Moves extremities.  Full Range of motion   Skin: Warm, intact  Neurological: No motor or sensory deficit       03-16-19 @ 07:01  -  03-17-19 @ 07:00  --------------------------------------------------------  IN:    EPINEPHrine Infusion: 1231.8 mL    IV PiggyBack: 400 mL    norepinephrine Infusion: 4323 mL    Packed Red Blood Cells: 350 mL    phenylephrine   Infusion: 1610.6 mL    Plasma: 639 mL    Platelets - Single Donor: 529 mL    sodium bicarbonate  Infusion: 1300 mL    sodium bicarbonate  Infusion: 4500 mL    vasopressin Infusion: 57.6 mL  Total IN: 35995 mL    OUT:    Drain: 30 mL    Indwelling Catheter - Urethral: 5 mL    Nasoenteral Tube: 200 mL    Other: 7023 mL  Total OUT: 7258 mL    Total NET: 7683 mL          LABS:                            9.2    2.02  )-----------( 60       ( 17 Mar 2019 04:50 )             28.5                                               03-17    144  |  92<L>  |  15  ----------------------------<  123<H>  2.8<L>   |  10<L>  |  1.0    Ca    6.2<L>      17 Mar 2019 04:50  Phos  3.5     03-17  Mg     1.5     03-17    TPro  3.5<L>  /  Alb  2.1<L>  /  TBili  6.1<H>  /  DBili  x   /  AST  7   /  ALT  596<H>  /  AlkPhos  733<H>  03-17      PT/INR - ( 16 Mar 2019 08:25 )   PT: 147.9 sec;   INR: See Note ratio         PTT - ( 16 Mar 2019 08:25 )  PTT:>200.0 sec                                                                                     LIVER FUNCTIONS - ( 17 Mar 2019 04:50 )  Alb: 2.1 g/dL / Pro: 3.5 g/dL / ALK PHOS: 733 U/L / ALT: 596 U/L / AST: 7 U/L / GGT: x                                                  Culture - Blood (collected 15 Mar 2019 04:39)  Source: .Blood None  Preliminary Report (16 Mar 2019 16:01):    No growth to date.    Culture - Body Fluid with Gram Stain (collected 14 Mar 2019 19:18)  Source: .Body Fluid None  Gram Stain (15 Mar 2019 06:44):    No polymorphonuclear cells seen    No organisms seen    by cytocentrifuge  Preliminary Report (16 Mar 2019 09:54):    No growth                                                   Mode: AC/ CMV (Assist Control/ Continuous Mandatory Ventilation)  RR (machine): 26  TV (machine): 480  FiO2: 50  PEEP: 5  ITime: 1  MAP: 18  PIP: 34                                      ABG - ( 17 Mar 2019 04:42 )  pH, Arterial: 7.18  pH, Blood: x     /  pCO2: 28    /  pO2: 93    / HCO3: 10    / Base Excess: -16.4 /  SaO2: 96                  MEDICATIONS  (STANDING):  cefepime   IVPB 2000 milliGRAM(s) IV Intermittent daily  chlorhexidine 0.12% Liquid 15 milliLiter(s) Oral Mucosa two times a day  chlorhexidine 4% Liquid 1 Application(s) Topical <User Schedule>  CRRT Treatment    <Continuous>  EPINEPHrine    Infusion 0.1 MICROgram(s)/kG/Min (15.975 mL/Hr) IV Continuous <Continuous>  fidaxomicin 200 milliGRAM(s) Oral two times a day  metroNIDAZOLE  IVPB 500 milliGRAM(s) IV Intermittent every 8 hours  midazolam Infusion 0.02 mG/kG/Hr (0.852 mL/Hr) IV Continuous <Continuous>  mupirocin 2% Ointment 1 Application(s) Topical two times a day  norepinephrine Infusion 0.05 MICROgram(s)/kG/Min (1.997 mL/Hr) IV Continuous <Continuous>  pantoprazole  Injectable 40 milliGRAM(s) IV Push every 12 hours  pantoprazole Infusion 8 mG/Hr (10 mL/Hr) IV Continuous <Continuous>  phenylephrine    Infusion 0.5 MICROgram(s)/kG/Min (3.994 mL/Hr) IV Continuous <Continuous>  potassium chloride  20 mEq/100 mL IVPB 20 milliEquivalent(s) IV Intermittent every 2 hours  PureFlow Dialysate RFP-400 (K 2 / Ca 3) 5000 milliLiter(s) (2000 mL/Hr) CRRT <Continuous>  rasburicase IVPB 8 milliGRAM(s) IV Intermittent daily  sodium bicarbonate  Infusion 0.88 mEq/kG/Hr (250 mL/Hr) IV Continuous <Continuous>  thiamine Injectable 100 milliGRAM(s) IV Push daily  vancomycin    Solution 250 milliGRAM(s) Oral every 6 hours  vasopressin Infusion 0.04 Unit(s)/Min (2.4 mL/Hr) IV Continuous <Continuous>    MEDICATIONS  (PRN):      Xrays:         ET OK OK.  No infiltrates                                                                             ECHO Patient is a 63y old  Female who presents with a chief complaint of septic shock (16 Mar 2019 09:02)        Over Night Events: On MV.  ON CVVHD.  on Multiple pressors.          ROS:  See HPI    PHYSICAL EXAM    ICU Vital Signs Last 24 Hrs  T(C): 34.4 (17 Mar 2019 04:00), Max: 36.2 (17 Mar 2019 00:00)  T(F): 93.9 (17 Mar 2019 04:00), Max: 97.2 (17 Mar 2019 00:00)  HR: 126 (17 Mar 2019 07:30) (120 - 144)  BP: --  BP(mean): --  ABP: 90/60 (17 Mar 2019 07:30) (68/52 - 140/76)  ABP(mean): 70 (17 Mar 2019 07:30) (56 - 94)  RR: 33 (17 Mar 2019 07:30) (25 - 38)  SpO2: 53% (17 Mar 2019 06:45) (53% - 98%)      General: In NAD   HEENT: + ET            Lymphatic system: No cervical LN   Lungs: Bilateral BS  Cardiovascular: Regular   Gastrointestinal: Soft, Positive BS  Extremities: No clubbing.  Full Range of motion   Skin: Warm, intact  Neurological: NO response to pain X Upper extremities        03-16-19 @ 07:01  -  03-17-19 @ 07:00  --------------------------------------------------------  IN:    EPINEPHrine Infusion: 1231.8 mL    IV PiggyBack: 400 mL    norepinephrine Infusion: 4323 mL    Packed Red Blood Cells: 350 mL    phenylephrine   Infusion: 1610.6 mL    Plasma: 639 mL    Platelets - Single Donor: 529 mL    sodium bicarbonate  Infusion: 1300 mL    sodium bicarbonate  Infusion: 4500 mL    vasopressin Infusion: 57.6 mL  Total IN: 48643 mL    OUT:    Drain: 30 mL    Indwelling Catheter - Urethral: 5 mL    Nasoenteral Tube: 200 mL    Other: 7023 mL  Total OUT: 7258 mL    Total NET: 7683 mL          LABS:                            9.2    2.02  )-----------( 60       ( 17 Mar 2019 04:50 )             28.5                                               03-17    144  |  92<L>  |  15  ----------------------------<  123<H>  2.8<L>   |  10<L>  |  1.0    Ca    6.2<L>      17 Mar 2019 04:50  Phos  3.5     03-17  Mg     1.5     03-17    TPro  3.5<L>  /  Alb  2.1<L>  /  TBili  6.1<H>  /  DBili  x   /  AST  7   /  ALT  596<H>  /  AlkPhos  733<H>  03-17      PT/INR - ( 16 Mar 2019 08:25 )   PT: 147.9 sec;   INR: See Note ratio         PTT - ( 16 Mar 2019 08:25 )  PTT:>200.0 sec                                                                                     LIVER FUNCTIONS - ( 17 Mar 2019 04:50 )  Alb: 2.1 g/dL / Pro: 3.5 g/dL / ALK PHOS: 733 U/L / ALT: 596 U/L / AST: 7 U/L / GGT: x                                                  Culture - Blood (collected 15 Mar 2019 04:39)  Source: .Blood None  Preliminary Report (16 Mar 2019 16:01):    No growth to date.    Culture - Body Fluid with Gram Stain (collected 14 Mar 2019 19:18)  Source: .Body Fluid None  Gram Stain (15 Mar 2019 06:44):    No polymorphonuclear cells seen    No organisms seen    by cytocentrifuge  Preliminary Report (16 Mar 2019 09:54):    No growth                                                   Mode: AC/ CMV (Assist Control/ Continuous Mandatory Ventilation)  RR (machine): 26  TV (machine): 480  FiO2: 50  PEEP: 5  ITime: 1  MAP: 18  PIP: 34                                      ABG - ( 17 Mar 2019 04:42 )  pH, Arterial: 7.15  pH, Blood: x     /  pCO2: 31    /  pO2: 73    / HCO3: 10    / Base Excess: -16.4 /  SaO2: 96                  MEDICATIONS  (STANDING):  cefepime   IVPB 2000 milliGRAM(s) IV Intermittent daily  chlorhexidine 0.12% Liquid 15 milliLiter(s) Oral Mucosa two times a day  chlorhexidine 4% Liquid 1 Application(s) Topical <User Schedule>  CRRT Treatment    <Continuous>  EPINEPHrine    Infusion 0.1 MICROgram(s)/kG/Min (15.975 mL/Hr) IV Continuous <Continuous>  fidaxomicin 200 milliGRAM(s) Oral two times a day  metroNIDAZOLE  IVPB 500 milliGRAM(s) IV Intermittent every 8 hours  midazolam Infusion 0.02 mG/kG/Hr (0.852 mL/Hr) IV Continuous <Continuous>  mupirocin 2% Ointment 1 Application(s) Topical two times a day  norepinephrine Infusion 0.05 MICROgram(s)/kG/Min (1.997 mL/Hr) IV Continuous <Continuous>  pantoprazole  Injectable 40 milliGRAM(s) IV Push every 12 hours  pantoprazole Infusion 8 mG/Hr (10 mL/Hr) IV Continuous <Continuous>  phenylephrine    Infusion 0.5 MICROgram(s)/kG/Min (3.994 mL/Hr) IV Continuous <Continuous>  potassium chloride  20 mEq/100 mL IVPB 20 milliEquivalent(s) IV Intermittent every 2 hours  PureFlow Dialysate RFP-400 (K 2 / Ca 3) 5000 milliLiter(s) (2000 mL/Hr) CRRT <Continuous>  rasburicase IVPB 8 milliGRAM(s) IV Intermittent daily  sodium bicarbonate  Infusion 0.88 mEq/kG/Hr (250 mL/Hr) IV Continuous <Continuous>  thiamine Injectable 100 milliGRAM(s) IV Push daily  vancomycin    Solution 250 milliGRAM(s) Oral every 6 hours  vasopressin Infusion 0.04 Unit(s)/Min (2.4 mL/Hr) IV Continuous <Continuous>    MEDICATIONS  (PRN):      Xrays:         ET OK OK.  No infiltrates                                                                             ECHO

## 2019-03-17 NOTE — PROVIDER CONTACT NOTE (OTHER) - RECOMMENDATIONS
AS Per Daughter Tony Garcia, comfort measures are requested for patient and life-saving treatments to be withdrawn. Dr. Gallegos notified and aware, Attempted to contact Dr. Quintanilla.

## 2019-03-17 NOTE — PROGRESS NOTE ADULT - ASSESSMENT
63yF    New dx of Lymphoma  Hypertension  Recent hospital admission OSH for HF   BMI 17    Admitted with vomiting, found to have SEVERE SEPSIS ADONIS UREMIA HYPOGLYCEMIA SEVERE DEHYDRATION  Imaging concerning for cholecystitis, s/p percutaneous lisbeth 3/14, no PMN, cx NGTD  Intubated 3/15  On 3 pressors, lactic acidosis >25, DIC, Fevers to 103  Possible tumor lysis s/p rasburicase  +Cdiff but no diarrhea or leukocytosis, however CT AP Circumferential wall thickening of the underdistended colon, suggestive of pancolitis, likely of infectious etiology.  New small bilateral pleural effusions. Stable right inguinal 4.1 x 3.3 cm air/fluid collection and adjacent lymph nodes. Stable diffuse abdominal aorta aneurysmal dilatation measuring up to 4.6 cm  Bcx 3/13 NGTD    wbc 2.02  Pt with pancytopenia, thrombocytopenia, hypomagnesemia, Inc AP,     Unclear if all cdiff as no diarrhea, however CT with pancolitis, likely ischemia    On cefepime   IVPB 2000 milliGRAM(s) IV Intermittent daily  fidaxomicin 200 milliGRAM(s) Oral two times a day  metroNIDAZOLE  IVPB 500 milliGRAM(s) IV Intermittent every 8 hours  vancomycin    Solution 250 milliGRAM(s) Oral every 6 hours     PLAN  Continue fidaxomicin 200mg BID PO  - Continue PO vanc 250 q6h, can also give rectally  - Continue IV flagyl 500 q8h  - Continue cefepime 1g q24h, would D/C if repeat bcx NGTD  - If continued diarrhea send GI PCR, stool o&P  - send stronglyoides ab  - HIV CMIA  - Heme/Onc following

## 2019-03-17 NOTE — CHART NOTE - NSCHARTNOTEFT_GEN_A_CORE
The patient's proxy, her daughter Tony Garcia discussed with me and Dr. Brower separately her wishes to withdraw care for the patient and to make her as comfortable as possible.  She repeated back to us her wishes to withdraw care, and she does not want to be present when the patient passes.  She repeated back to us that she wants her The patient's proxy, her daughter Tony Garcia discussed with me and Dr. Brower separately her wishes to withdraw care for the patient and to make her as comfortable as possible.  She repeated back to us her wishes to withdraw care, and she does not want to be present when the patient passes.  She repeated back to us that she wants her to "be comfortable" and is agreeable to having morphine drip to help with the pain and any breathing problems. This was discussed with Fellow John Gallegos who agreed with the plan, he tried to contact Dr Mabry multiple times but to no avail. The Palliative team was called and they said a verbal consent was enough to liberate and stop pressors. They recommended 4mg morphine and 1 mg ativan prior to extubation and 0.4mg glycopyrollate for secretions The patient's proxy, her daughter Tony Garcia discussed with me and Dr. Brower separately her wishes to withdraw care for the patient and to make her as comfortable as possible.  She repeated back to us her wishes to withdraw care, and she does not want to be present when the patient passes.  She repeated back to us that she wants her to "be comfortable" and is agreeable to having morphine drip to help with the pain and any breathing problems. This was discussed with Fellow John Gallegos who agreed with the plan, he tried to contact Dr Mabry multiple times but to no avail. The Palliative team was called and they said a verbal consent was enough to liberate and stop pressors. They recommended 4mg morphine and 1 mg ativan prior to extubation and 0.4mg glycopyrollate for secretions, then morphine 4mg q30min for resp distress, and ativan 1mg q30min. If she needs more than 3 pushes, then we will start morphine gtt. PT's family members, including brother, are at bedside and aware of the plan.

## 2019-03-18 LAB
CULTURE RESULTS: SIGNIFICANT CHANGE UP
SPECIMEN SOURCE: SIGNIFICANT CHANGE UP
STRONGYLOIDES AB SER-ACNC: NEGATIVE — SIGNIFICANT CHANGE UP

## 2019-03-19 ENCOUNTER — APPOINTMENT (OUTPATIENT)
Dept: HEMATOLOGY ONCOLOGY | Facility: CLINIC | Age: 64
End: 2019-03-19

## 2019-03-19 DIAGNOSIS — K81.0 ACUTE CHOLECYSTITIS: ICD-10-CM

## 2019-03-19 DIAGNOSIS — E16.2 HYPOGLYCEMIA, UNSPECIFIED: ICD-10-CM

## 2019-03-19 DIAGNOSIS — D69.6 THROMBOCYTOPENIA, UNSPECIFIED: ICD-10-CM

## 2019-03-19 DIAGNOSIS — N17.9 ACUTE KIDNEY FAILURE, UNSPECIFIED: ICD-10-CM

## 2019-03-19 DIAGNOSIS — D65 DISSEMINATED INTRAVASCULAR COAGULATION [DEFIBRINATION SYNDROME]: ICD-10-CM

## 2019-03-19 DIAGNOSIS — Z79.82 LONG TERM (CURRENT) USE OF ASPIRIN: ICD-10-CM

## 2019-03-19 DIAGNOSIS — A04.72 ENTEROCOLITIS DUE TO CLOSTRIDIUM DIFFICILE, NOT SPECIFIED AS RECURRENT: ICD-10-CM

## 2019-03-19 DIAGNOSIS — N19 UNSPECIFIED KIDNEY FAILURE: ICD-10-CM

## 2019-03-19 DIAGNOSIS — E83.51 HYPOCALCEMIA: ICD-10-CM

## 2019-03-19 DIAGNOSIS — A41.9 SEPSIS, UNSPECIFIED ORGANISM: ICD-10-CM

## 2019-03-19 DIAGNOSIS — J96.90 RESPIRATORY FAILURE, UNSPECIFIED, UNSPECIFIED WHETHER WITH HYPOXIA OR HYPERCAPNIA: ICD-10-CM

## 2019-03-19 DIAGNOSIS — I71.4 ABDOMINAL AORTIC ANEURYSM, WITHOUT RUPTURE: ICD-10-CM

## 2019-03-19 DIAGNOSIS — I50.9 HEART FAILURE, UNSPECIFIED: ICD-10-CM

## 2019-03-19 DIAGNOSIS — E43 UNSPECIFIED SEVERE PROTEIN-CALORIE MALNUTRITION: ICD-10-CM

## 2019-03-19 DIAGNOSIS — R65.21 SEVERE SEPSIS WITH SEPTIC SHOCK: ICD-10-CM

## 2019-03-19 DIAGNOSIS — E87.2 ACIDOSIS: ICD-10-CM

## 2019-03-19 DIAGNOSIS — I11.0 HYPERTENSIVE HEART DISEASE WITH HEART FAILURE: ICD-10-CM

## 2019-03-19 DIAGNOSIS — C85.90 NON-HODGKIN LYMPHOMA, UNSPECIFIED, UNSPECIFIED SITE: ICD-10-CM

## 2019-03-19 DIAGNOSIS — E83.42 HYPOMAGNESEMIA: ICD-10-CM

## 2019-03-19 DIAGNOSIS — R74.0 NONSPECIFIC ELEVATION OF LEVELS OF TRANSAMINASE AND LACTIC ACID DEHYDROGENASE [LDH]: ICD-10-CM

## 2019-03-19 DIAGNOSIS — D64.9 ANEMIA, UNSPECIFIED: ICD-10-CM

## 2019-03-19 LAB
CULTURE RESULTS: SIGNIFICANT CHANGE UP
CULTURE RESULTS: SIGNIFICANT CHANGE UP
GLUCOSE BLDC GLUCOMTR-MCNC: 116 MG/DL — HIGH (ref 70–99)
GLUCOSE BLDC GLUCOMTR-MCNC: 120 MG/DL — HIGH (ref 70–99)
GLUCOSE BLDC GLUCOMTR-MCNC: 77 MG/DL — SIGNIFICANT CHANGE UP (ref 70–99)
GLUCOSE BLDC GLUCOMTR-MCNC: 84 MG/DL — SIGNIFICANT CHANGE UP (ref 70–99)
SPECIMEN SOURCE: SIGNIFICANT CHANGE UP
SPECIMEN SOURCE: SIGNIFICANT CHANGE UP

## 2019-03-20 LAB
CULTURE RESULTS: SIGNIFICANT CHANGE UP
SPECIMEN SOURCE: SIGNIFICANT CHANGE UP

## 2019-03-22 LAB
CULTURE RESULTS: SIGNIFICANT CHANGE UP
SPECIMEN SOURCE: SIGNIFICANT CHANGE UP

## 2020-11-22 NOTE — CONSULT NOTE ADULT - SUBJECTIVE AND OBJECTIVE BOX
yes 64 y/o F with PMHx lymphoma (diagnosed 1 week ago via neck mass, not on chemo), HTN, "heart failure" that resulted in fluid overload, presenting for nausea and vomiting. After finding out about her diagnosis, she has not been eating a drinking and eating for the past 1 wk, but has been taking lasix 40mg daily. She woke up this morning feeling, nauseous, weak, and this then resulted in the pt going to . There she was hypotensive and sent to the ER. She was hypotensive, febrile in the ED. She received 5L bolus, was started on levo and vasopressin, a R IJ central line and A line was placed bc her bp on the cuff was labile. She had CT abd and RUQ sono which showed concern for cholecystitis. Surgery evaluated pt and was not impressed with clinical picture. GI evaluated pt at bedside and stated she is low risk for chloledochelithiasis and recommended percutaneous lisbeth if the labwork worsens. Pt has no hx of gallbladder dx,  no abd sx besides .             CONSULT GENERAL SURGERY:  VASCULAR SUGERY CONSULT:    SALONI COOPER  [id number]  [facility] Hold [id number]    Attending Requested :Christen Loaiza; Not Available Doctor; Alaina Garcia; Joel Taylor; User ADM; Luisa Brower; Saud Adams; Saud Adams; Team [facility]; Team [facility] Gen Surg Tap; Ej Carbajal; Juanita Pastrana  19 @ 21:32  Routine []   Stat[]  Specialty: Vascular Surgery General Surgery    Clinical Issue to be evaluated by consultant:    HPI:  Patient is a 63y Female presenting with            Past Medical History/ Surgical History:  SEVERE SEPSIS ADONIS ACUTE KIDNEY INJURY UREMIA HYPOGLYCEMIA SEVERE DEHYDRATI  ^UCC SENT FOR WEAKNESS  Handoff  MEWS Score  Hypertension, unspecified type  Lymphoma, unspecified body region, unspecified lymphoma type  Weakness  Severe sepsis  S/P   UCC SENT FOR WEAKNESS  Cholecystitis  Hyperkalemia  Lymphoma  Severe protein-calorie malnutrition  Lactic acidosis  Anemia  Severe dehydration  Hypoglycemia  Uremia  ADONIS (acute kidney injury)    Allergies:No Known Allergies    Medications:aspirin 81 mg oral tablet, chewable: 1 tab(s) orally once a day  furosemide 40 mg oral tablet: 1 tab(s) orally once a day  ramipril 10 mg oral capsule: 1 cap(s) orally once a day  dextrose 5% 1000 milliLiter(s) IV Continuous <Continuous>  norepinephrine Infusion 0.05 MICROgram(s)/kG/Min IV Continuous <Continuous>  piperacillin/tazobactam IVPB. 3.375 Gram(s) IV Intermittent once  piperacillin/tazobactam IVPB. 3.375 Gram(s) IV Intermittent every 12 hours  vancomycin  IVPB 750 milliGRAM(s) IV Intermittent once  vancomycin  IVPB 750 milliGRAM(s) IV Intermittent every 12 hours  vasopressin Infusion 0.04 Unit(s)/Min IV Continuous <Continuous>      Physical Exam:  Vitals:T(C): 37.7 (19 @ 16:20), Max: 38.2 (19 @ 15:19)  HR: 69 (19 @ 16:20) (68 - 69)  BP: 102/49 (19 @ 16:20) (83/48 - 102/49)  RR: 17 (19 @ 16:20) (17 - 18)  SpO2: 100% (19 @ 16:20) (100% - 100%)    General: Alert and oriented times 3 , Not in acute distress   Heart: Regular rate and rhythm , no rubs murmurs or gallops  Lungs: Clear to auscultation , no wheezes , rales rhonci or adventicious breath sounds  Abdomen: Soft , positive bowel sounds, non tender, non distended, no peritoneal signs, no pulsatile masses  Extemities: warm, capillary refill, no swelling , no edema, good motor and sensation positive pulses                  9.0    3.84  )-----------( 30       (  @ 15:05 )             28.2                    138   |  98    |  82                 Ca: 9.3    BMP:   ----------------------------< 35     M.6   (19 @ 15:05)             6.0    |  13    | 3.1                Ph: x        LFT:     TPro: 5.4 / Alb: 3.4 / TBili: 2.4 / DBili: x / AST: 809 / ALT: 149 / AlkPhos: 846   (19 @ 15:05)          PT/INR - ( 13 Mar 2019 15:05 )   PT: 15.00 sec;   INR: 1.31 ratio         PTT - ( 13 Mar 2019 15:05 )  PTT:43.3 sec    CARDIAC MARKERS ( 13 Mar 2019 15:05 )  x     / <0.01 ng/mL / x     / x     / x            Urinalysis Basic - ( 13 Mar 2019 19:44 )    Color: Yellow / Appearance: Cloudy / S.015 / pH: x  Gluc: x / Ketone: Negative  / Bili: Negative / Urobili: 1.0 mg/dL   Blood: x / Protein: 30 mg/dL / Nitrite: Negative   Leuk Esterase: Negative / RBC: 3-5 /HPF / WBC x   Sq Epi: x / Non Sq Epi: Few /HPF / Bacteria: Few /HPF        < from: CT Abdomen and Pelvis No Cont (19 @ 18:22) >    1. Aneurysmal dilation of the abdominal aorta up to 4.3 cm with a focal   area of crescentic wall density suggestive of an intramural hematoma.    2. Gallbladder wall edema and perihepatic fluid. Findings suggestive of   underlying cholecystitis.    3. Extensive retroperitoneal and pelvic lymphadenopathy with associated   splenomegaly. Findings compatible with underlying lymphoma.    4. Air and fluid collection in the anterior thigh measuring up to 5.2 cm.    < end of copied text >                Assesment and Plan:  Patient is a 63y Female presenting with sepsis and incidental finding of aaa    Attending to see   blood pressure control 110-140 systolic  sepsis workup   formal cta when patient is stable       SIDRA Mccurdy  19 @ 21:32  #[id number]/ [id number]

## 2022-01-12 NOTE — ED PROCEDURE NOTE - ANTERIOR GB WALL THICKNESS MEASUREMENT
Vaginitis: Patient complains of an abnormal vaginal discharge for 2 weeks. Vaginal symptoms include discharge described as white, local irritation and odor. Vulvar symptoms include local irritation and odor. STI Risk: Possible STD exposure   Discharge described as: white and yellow . Menstrual pattern: She had been bleeding regularly. Contraception: none     There were no vitals taken for this visit. ALLERGIES:    O-  Physical Exam  Genitourinary:     General: Normal vulva. Labia:         Right: No rash, tenderness, lesion or injury. Left: No rash, tenderness, lesion or injury. Vagina: Normal. No foreign body. No vaginal discharge, erythema, tenderness, bleeding or lesions. A.Vaginitis  options. P. Affirm collected and sent to lab  GC/CT  Will treat w/Flagyl for BV or Diflucan if yeast pending results  Condoms advised for prevention of STI's and pregnancy  She was also counseled on her preventative health maintenance recommendations and follow-up.   RTO annual exam and PRN 4.7

## 2024-08-28 NOTE — CHART NOTE - NSCHARTNOTEFT_GEN_A_CORE
63 year old female s/p udall placement this AM for CVVH. Patient seen and examined, intubated/sedated.    Objective:  Vital Signs Last 24 Hrs  T(C): 39.9 (15 Mar 2019 12:00), Max: 39.9 (15 Mar 2019 12:00)  T(F): 103.8 (15 Mar 2019 12:00), Max: 103.8 (15 Mar 2019 12:00)  HR: 140 (15 Mar 2019 13:00) (108 - 152)  BP: 80/49 (15 Mar 2019 08:00) (68/38 - 113/53)  BP(mean): 59 (15 Mar 2019 08:00) (48 - 73)  ABP: 86/54 (15 Mar 2019 13:00) (78/40 - 140/52)  ABP(mean): 66 (15 Mar 2019 13:00) (20 - 80)  RR: 33 (15 Mar 2019 13:00) (18 - 37)  SpO2: 98% (15 Mar 2019 13:00) (94% - 100%)    Gen: Intubated, sedated, currently on CVVH   Left Groin: Soft, no bleeding or hematoma  Ext: + distal pulses, no edema    Labs:                         6.6    4.82  )-----------( 23       ( 15 Mar 2019 13:40 )             21.9     03-15    144  |  98  |  69<HH>  ----------------------------<  182<H>  4.6   |  5<LL>  |  3.1<H>    Ca    7.5<L>      15 Mar 2019 05:20  Phos  5.8     03-15  Mg     2.2     03-15    TPro  4.1<L>  /  Alb  2.5<L>  /  TBili  4.0<H>  /  DBili  x   /  AST  1096<H>  /  ALT  122<H>  /  AlkPhos  598<H>  03-15    PT/INR - ( 15 Mar 2019 01:40 )   PT: 18.90 sec;   INR: 1.65 ratio         PTT - ( 15 Mar 2019 01:40 )  PTT:57.2 sec Hide Cerave Products: No Detail Level: Zone Action 4: Continue Continue Regimen: Differin cream nightly Initiate Treatment: Cirtain Dri deodorant nightly to hands nightly Plan: Long socks while in the sun. Possible biopsy. Initiate Treatment: triamcinolone acetonide 0.1 % topical cream \\nSig: Apply to affected areas on hands twice daily x for up to 2 weeks. Avoid face, groin and underarms.
